# Patient Record
Sex: FEMALE | Race: OTHER | Employment: FULL TIME | URBAN - METROPOLITAN AREA
[De-identification: names, ages, dates, MRNs, and addresses within clinical notes are randomized per-mention and may not be internally consistent; named-entity substitution may affect disease eponyms.]

---

## 2017-04-25 ENCOUNTER — TELEPHONE (OUTPATIENT)
Dept: OBGYN CLINIC | Facility: CLINIC | Age: 42
End: 2017-04-25

## 2017-04-25 DIAGNOSIS — Z00.00 VISIT FOR ANNUAL HEALTH EXAMINATION: Primary | ICD-10-CM

## 2017-04-25 NOTE — TELEPHONE ENCOUNTER
PER PT REQUESTING AN COPY OF MAMMO TEST ORDER TO BE PUT INTO THE SYSTEM / PT STATE SHE MISS PLACED THE ONE  GAVE HER LAST YEAR / PLS ADV

## 2017-04-25 NOTE — TELEPHONE ENCOUNTER
Patient requesting to have a new mammogram order placed. The one in the system was from 3/9/2016 and has . She wants to have her mammogram done before her OV on 5/10. Thanks.

## 2017-05-17 ENCOUNTER — HOSPITAL ENCOUNTER (OUTPATIENT)
Dept: MAMMOGRAPHY | Age: 42
Discharge: HOME | End: 2017-05-17
Attending: OBSTETRICS & GYNECOLOGY
Payer: COMMERCIAL

## 2017-05-17 DIAGNOSIS — Z00.00 VISIT FOR ANNUAL HEALTH EXAMINATION: ICD-10-CM

## 2017-05-17 PROCEDURE — 77067 SCR MAMMO BI INCL CAD: CPT | Performed by: OBSTETRICS & GYNECOLOGY

## 2017-05-25 ENCOUNTER — OFFICE VISIT (OUTPATIENT)
Dept: OBGYN CLINIC | Facility: CLINIC | Age: 42
End: 2017-05-25

## 2017-05-25 VITALS
DIASTOLIC BLOOD PRESSURE: 90 MMHG | HEART RATE: 93 BPM | WEIGHT: 145 LBS | SYSTOLIC BLOOD PRESSURE: 130 MMHG | BODY MASS INDEX: 25 KG/M2

## 2017-05-25 DIAGNOSIS — Z01.419 ENCOUNTER FOR GYNECOLOGICAL EXAMINATION WITHOUT ABNORMAL FINDING: ICD-10-CM

## 2017-05-25 DIAGNOSIS — Z12.31 ENCOUNTER FOR SCREENING MAMMOGRAM FOR BREAST CANCER: Primary | ICD-10-CM

## 2017-05-25 PROCEDURE — 99396 PREV VISIT EST AGE 40-64: CPT | Performed by: OBSTETRICS & GYNECOLOGY

## 2017-05-25 NOTE — PROGRESS NOTES
HPI:    Patient ID: Renny Booker is a 43year old female. HPI  Patient here for routine exam.  No C/Os. Patient in her fifth year with Paragard IUD. LPS 2016 WNL with - HPV. Next pap due in 2019. Had a normal mammogram this year.   Mammogram order ASSESSMENT/PLAN:   Encounter for screening mammogram for breast cancer  (primary encounter diagnosis)  Encounter for gynecological examination without abnormal finding [z01.419]   F/U Labs. Encouraged monthly SBE. Discussed diet and exercise.   No order

## 2017-05-30 ENCOUNTER — HOSPITAL ENCOUNTER (EMERGENCY)
Dept: CT IMAGING | Facility: HOSPITAL | Age: 42
Discharge: HOME | End: 2017-05-30
Attending: EMERGENCY MEDICINE
Payer: COMMERCIAL

## 2017-05-30 ENCOUNTER — APPOINTMENT (OUTPATIENT)
Dept: CT IMAGING | Facility: HOSPITAL | Age: 42
End: 2017-05-30
Attending: EMERGENCY MEDICINE
Payer: COMMERCIAL

## 2017-05-30 ENCOUNTER — HOSPITAL ENCOUNTER (EMERGENCY)
Facility: HOSPITAL | Age: 42
Discharge: HOME | End: 2017-05-30
Attending: EMERGENCY MEDICINE
Payer: COMMERCIAL

## 2017-05-30 ENCOUNTER — TELEPHONE (OUTPATIENT)
Dept: INTERNAL MEDICINE CLINIC | Facility: CLINIC | Age: 42
End: 2017-05-30

## 2017-05-30 VITALS
BODY MASS INDEX: 26.13 KG/M2 | OXYGEN SATURATION: 98 % | TEMPERATURE: 98 F | HEIGHT: 62 IN | RESPIRATION RATE: 18 BRPM | SYSTOLIC BLOOD PRESSURE: 102 MMHG | HEART RATE: 60 BPM | WEIGHT: 142 LBS | DIASTOLIC BLOOD PRESSURE: 72 MMHG

## 2017-05-30 DIAGNOSIS — R07.9 ACUTE CHEST PAIN: Primary | ICD-10-CM

## 2017-05-30 PROCEDURE — 81025 URINE PREGNANCY TEST: CPT

## 2017-05-30 PROCEDURE — 81003 URINALYSIS AUTO W/O SCOPE: CPT | Performed by: EMERGENCY MEDICINE

## 2017-05-30 PROCEDURE — 96375 TX/PRO/DX INJ NEW DRUG ADDON: CPT

## 2017-05-30 PROCEDURE — 99285 EMERGENCY DEPT VISIT HI MDM: CPT

## 2017-05-30 PROCEDURE — 80048 BASIC METABOLIC PNL TOTAL CA: CPT | Performed by: EMERGENCY MEDICINE

## 2017-05-30 PROCEDURE — 85025 COMPLETE CBC W/AUTO DIFF WBC: CPT | Performed by: EMERGENCY MEDICINE

## 2017-05-30 PROCEDURE — 84484 ASSAY OF TROPONIN QUANT: CPT | Performed by: EMERGENCY MEDICINE

## 2017-05-30 PROCEDURE — 93005 ELECTROCARDIOGRAM TRACING: CPT

## 2017-05-30 PROCEDURE — 93010 ELECTROCARDIOGRAM REPORT: CPT | Performed by: EMERGENCY MEDICINE

## 2017-05-30 PROCEDURE — 96374 THER/PROPH/DIAG INJ IV PUSH: CPT

## 2017-05-30 PROCEDURE — 75574 CT ANGIO HRT W/3D IMAGE: CPT | Performed by: EMERGENCY MEDICINE

## 2017-05-30 RX ORDER — METOPROLOL TARTRATE 5 MG/5ML
INJECTION INTRAVENOUS
Status: COMPLETED
Start: 2017-05-30 | End: 2017-05-30

## 2017-05-30 RX ORDER — ALPRAZOLAM 0.25 MG/1
0.25 TABLET ORAL
Status: COMPLETED | OUTPATIENT
Start: 2017-05-30 | End: 2017-05-30

## 2017-05-30 RX ORDER — NITROGLYCERIN 0.4 MG/1
TABLET SUBLINGUAL
Status: DISCONTINUED
Start: 2017-05-30 | End: 2017-05-30 | Stop reason: WASHOUT

## 2017-05-30 RX ORDER — NITROGLYCERIN 0.4 MG/1
0.4 TABLET SUBLINGUAL ONCE
Status: COMPLETED | OUTPATIENT
Start: 2017-05-30 | End: 2017-05-30

## 2017-05-30 RX ORDER — METOPROLOL TARTRATE 50 MG/1
100 TABLET, FILM COATED ORAL ONCE AS NEEDED
Status: CANCELLED | OUTPATIENT
Start: 2017-05-30 | End: 2017-05-30

## 2017-05-30 RX ORDER — NITROGLYCERIN 0.4 MG/1
0.4 TABLET SUBLINGUAL ONCE
Status: CANCELLED | OUTPATIENT
Start: 2017-05-30 | End: 2017-05-30

## 2017-05-30 RX ORDER — DILTIAZEM HYDROCHLORIDE 5 MG/ML
10 INJECTION INTRAVENOUS SEE ADMIN INSTRUCTIONS
Status: DISCONTINUED | OUTPATIENT
Start: 2017-05-30 | End: 2017-05-30

## 2017-05-30 RX ORDER — METOPROLOL TARTRATE 5 MG/5ML
5 INJECTION INTRAVENOUS SEE ADMIN INSTRUCTIONS
Status: DISCONTINUED | OUTPATIENT
Start: 2017-05-30 | End: 2017-05-30

## 2017-05-30 RX ORDER — METOPROLOL TARTRATE 50 MG/1
50 TABLET, FILM COATED ORAL ONCE AS NEEDED
Status: DISCONTINUED | OUTPATIENT
Start: 2017-05-30 | End: 2017-05-30

## 2017-05-30 RX ORDER — METOPROLOL TARTRATE 50 MG/1
50 TABLET, FILM COATED ORAL ONCE
Status: CANCELLED | OUTPATIENT
Start: 2017-05-30 | End: 2017-05-30

## 2017-05-30 RX ORDER — METOPROLOL TARTRATE 5 MG/5ML
5 INJECTION INTRAVENOUS SEE ADMIN INSTRUCTIONS
Status: CANCELLED | OUTPATIENT
Start: 2017-05-30

## 2017-05-30 RX ORDER — DILTIAZEM HYDROCHLORIDE 5 MG/ML
10 INJECTION INTRAVENOUS SEE ADMIN INSTRUCTIONS
Status: CANCELLED | OUTPATIENT
Start: 2017-05-30

## 2017-05-30 RX ORDER — DILTIAZEM HYDROCHLORIDE 5 MG/ML
INJECTION INTRAVENOUS
Status: DISCONTINUED
Start: 2017-05-30 | End: 2017-05-30

## 2017-05-30 RX ORDER — ALPRAZOLAM 0.25 MG/1
0.25 TABLET ORAL
Status: CANCELLED | OUTPATIENT
Start: 2017-05-30 | End: 2017-05-30

## 2017-05-30 RX ORDER — METOPROLOL TARTRATE 50 MG/1
50 TABLET, FILM COATED ORAL ONCE AS NEEDED
Status: CANCELLED | OUTPATIENT
Start: 2017-05-30 | End: 2017-05-30

## 2017-05-30 RX ORDER — METOPROLOL TARTRATE 50 MG/1
100 TABLET, FILM COATED ORAL ONCE
Status: CANCELLED | OUTPATIENT
Start: 2017-05-30 | End: 2017-05-30

## 2017-05-30 RX ORDER — METOPROLOL TARTRATE 50 MG/1
100 TABLET, FILM COATED ORAL ONCE AS NEEDED
Status: COMPLETED | OUTPATIENT
Start: 2017-05-30 | End: 2017-05-30

## 2017-05-30 NOTE — TELEPHONE ENCOUNTER
Actions Requested: Pt referred to ED  Situation/Background   Problem: chest pain, htn   Onset: Friday   Associated Symptoms: Denies diaphoresis, nausea, weakness or dizziness.    History of Same: No   Precipitated By:    Aggravating/Alleviating Factors: Pt nitroglycerin and chest pain was not relieved, history of heart disease (i.e., angina, heart attack, bypass surgery, angioplasty, CHF)  * Visible sweat on face or sweat dripping down face  * Sounds like a life-threatening emergency to the triager  * Severe

## 2017-05-30 NOTE — ED NOTES
Spoke with Aisha Lopez RN for CTA and she requested ER gives 1st dose of lopressor 5mg ivp which was just given pt heart rate is 63/min at this time

## 2017-05-30 NOTE — CM/SW NOTE
Patient inquired about information on Power of GOSO. Papers given to her to read and she knows I am available to answer any questions. Reviewed paper work with her. Patient decided to take them home and complete them later.

## 2017-05-30 NOTE — IMAGING NOTE
HX TAKEN: PT PRESENTED TO ER WITH COMPLAINT OF CHEST PRESSURE AND ELEVATED BP    PT CONSENTED IN THE ER    18 GAUGE IV STARTED IN ER  CREAT = 0.63  GFR>60    PER ER NURSE REPORT, PT RECEIVED IN ER: COMPLAINT OF CHEST PRESSURE  PT RECEIVED IN THE ER  1139

## 2017-05-30 NOTE — IMAGING NOTE
CT angiogram with normal LAD and 2 Diags  LCX and 2 OM normal.  RCA large dominant normal PDA PLV normal.

## 2017-05-30 NOTE — TELEPHONE ENCOUNTER
Pt blood pressure is high and is having chest pain ,  The pain has been on going but getting worse ,

## 2017-05-31 NOTE — TELEPHONE ENCOUNTER
LMTCB, please transfer to x  until 530 today or x [phone] -    Follow up call from ER - CSS please offer f/u appt with EG

## 2017-06-08 NOTE — PROGRESS NOTES
HPI:    Patient ID: Renny Booker is a 43year old female.     HPI     Chest pain   5/30/2017 patient went to the ER due to chest pain; EKG done; negative troponin; labs unremarkable; normal coronary CT angiography; no acute findings concerning for CAD, ch place, and time. She appears well-developed and well-nourished. HENT:   Head: Normocephalic and atraumatic. Eyes: Conjunctivae are normal.   Neck: Normal range of motion. Cardiovascular: Normal rate, regular rhythm and normal heart sounds.     No murm Leisa Cabezas MD.   Electronically Signed: Ashwini Sommer, 6/8/2017, 6:35 PM.    Jo Ann Chandler MD,  personally performed the services described in this documentation. All medical record entries made by the scribe were at my direction and in my presence.

## 2018-01-24 ENCOUNTER — OFFICE VISIT (OUTPATIENT)
Dept: INTERNAL MEDICINE CLINIC | Facility: CLINIC | Age: 43
End: 2018-01-24

## 2018-01-24 VITALS
SYSTOLIC BLOOD PRESSURE: 124 MMHG | TEMPERATURE: 98 F | HEART RATE: 76 BPM | WEIGHT: 146 LBS | DIASTOLIC BLOOD PRESSURE: 83 MMHG | BODY MASS INDEX: 24.92 KG/M2 | HEIGHT: 64 IN

## 2018-01-24 DIAGNOSIS — Z00.00 ROUTINE GENERAL MEDICAL EXAMINATION AT A HEALTH CARE FACILITY: Primary | ICD-10-CM

## 2018-01-24 DIAGNOSIS — Z12.31 VISIT FOR SCREENING MAMMOGRAM: ICD-10-CM

## 2018-01-24 PROCEDURE — 99396 PREV VISIT EST AGE 40-64: CPT | Performed by: INTERNAL MEDICINE

## 2018-01-27 ENCOUNTER — APPOINTMENT (OUTPATIENT)
Dept: LAB | Age: 43
End: 2018-01-27
Attending: INTERNAL MEDICINE
Payer: COMMERCIAL

## 2018-01-27 DIAGNOSIS — Z00.00 ROUTINE GENERAL MEDICAL EXAMINATION AT A HEALTH CARE FACILITY: ICD-10-CM

## 2018-01-27 LAB
ALBUMIN SERPL BCP-MCNC: 4.1 G/DL (ref 3.5–4.8)
ALBUMIN/GLOB SERPL: 1.6 {RATIO} (ref 1–2)
ALP SERPL-CCNC: 49 U/L (ref 32–100)
ALT SERPL-CCNC: 44 U/L (ref 14–54)
ANION GAP SERPL CALC-SCNC: 7 MMOL/L (ref 0–18)
AST SERPL-CCNC: 23 U/L (ref 15–41)
BACTERIA UR QL AUTO: NEGATIVE /HPF
BILIRUB SERPL-MCNC: 0.8 MG/DL (ref 0.3–1.2)
BUN SERPL-MCNC: 8 MG/DL (ref 8–20)
BUN/CREAT SERPL: 12.9 (ref 10–20)
CALCIUM SERPL-MCNC: 9.2 MG/DL (ref 8.5–10.5)
CHLORIDE SERPL-SCNC: 109 MMOL/L (ref 95–110)
CHOLEST SERPL-MCNC: 179 MG/DL (ref 110–200)
CO2 SERPL-SCNC: 25 MMOL/L (ref 22–32)
CREAT SERPL-MCNC: 0.62 MG/DL (ref 0.5–1.5)
ERYTHROCYTE [DISTWIDTH] IN BLOOD BY AUTOMATED COUNT: 12.7 % (ref 11–15)
GLOBULIN PLAS-MCNC: 2.5 G/DL (ref 2.5–3.7)
GLUCOSE SERPL-MCNC: 91 MG/DL (ref 70–99)
HCT VFR BLD AUTO: 39.7 % (ref 35–48)
HDLC SERPL-MCNC: 54 MG/DL
HGB BLD-MCNC: 13.5 G/DL (ref 12–16)
LDLC SERPL CALC-MCNC: 108 MG/DL (ref 0–99)
MCH RBC QN AUTO: 32.6 PG (ref 27–32)
MCHC RBC AUTO-ENTMCNC: 33.9 G/DL (ref 32–37)
MCV RBC AUTO: 96.3 FL (ref 80–100)
NONHDLC SERPL-MCNC: 125 MG/DL
OSMOLALITY UR CALC.SUM OF ELEC: 290 MOSM/KG (ref 275–295)
PLATELET # BLD AUTO: 283 K/UL (ref 140–400)
PMV BLD AUTO: 8.8 FL (ref 7.4–10.3)
POTASSIUM SERPL-SCNC: 3.8 MMOL/L (ref 3.3–5.1)
PROT SERPL-MCNC: 6.6 G/DL (ref 5.9–8.4)
RBC # BLD AUTO: 4.13 M/UL (ref 3.7–5.4)
RBC #/AREA URNS AUTO: 2 /HPF
SODIUM SERPL-SCNC: 141 MMOL/L (ref 136–144)
T4 FREE SERPL-MCNC: 1 NG/DL (ref 0.58–1.64)
TRIGL SERPL-MCNC: 83 MG/DL (ref 1–149)
TSH SERPL-ACNC: 1.48 UIU/ML (ref 0.45–5.33)
WBC # BLD AUTO: 8.9 K/UL (ref 4–11)
WBC #/AREA URNS AUTO: 4 /HPF

## 2018-01-27 PROCEDURE — 82306 VITAMIN D 25 HYDROXY: CPT

## 2018-01-27 PROCEDURE — 80061 LIPID PANEL: CPT

## 2018-01-27 PROCEDURE — 84439 ASSAY OF FREE THYROXINE: CPT

## 2018-01-27 PROCEDURE — 81015 MICROSCOPIC EXAM OF URINE: CPT

## 2018-01-27 PROCEDURE — 84443 ASSAY THYROID STIM HORMONE: CPT

## 2018-01-27 PROCEDURE — 85027 COMPLETE CBC AUTOMATED: CPT

## 2018-01-27 PROCEDURE — 80053 COMPREHEN METABOLIC PANEL: CPT

## 2018-01-27 PROCEDURE — 36415 COLL VENOUS BLD VENIPUNCTURE: CPT

## 2018-01-29 ENCOUNTER — TELEPHONE (OUTPATIENT)
Dept: INTERNAL MEDICINE CLINIC | Facility: CLINIC | Age: 43
End: 2018-01-29

## 2018-01-29 LAB — 25(OH)D3 SERPL-MCNC: 25.1 NG/ML

## 2018-01-29 NOTE — TELEPHONE ENCOUNTER
Notes Recorded by Marlena Cuenca MD on 1/29/2018 at 2:43 AM CST  All labs are within normal limit  Send letter and copy of test result to patient

## 2018-01-29 NOTE — TELEPHONE ENCOUNTER
Patient informed of results (identified name and ), as per Dr. SMITH's result note but informed Vit D lab is still \"in process. \" Patient voiced understanding and will await Vit D results.

## 2018-02-05 NOTE — PROGRESS NOTES
HPI:    Patient ID: Priya Vela is a 43year old female.     HPI     Physical exam    /83 (BP Location: Right arm, Patient Position: Sitting, Cuff Size: adult)   Pulse 76   Temp 98.1 °F (36.7 °C) (Oral)   Ht 5' 4\" (1.626 m)   Wt 146 lb (66.2 kg) multigravida 35+    • History of pregnancy , , [address] [facility] [facility], ,      x3; 5 hr labor , 12 hr labor- , 5 hr labor- ,        No past surgical history on file.    Family History   Problem Relation Age of Onset   • [facility]  (primary encounter diagnosis)  Plan: VITAMIN D, 25-HYDROXY, ASSAY, THYROID STIM         HORMONE, CBC, PLATELET; NO DIFFERENTIAL, COMP         METABOLIC PANEL (14), FREE T4 (FREE THYROXINE),        LIPID PANEL, URINE MICROSCOPIC W REFLEX CULT

## 2018-02-07 ENCOUNTER — OFFICE VISIT (OUTPATIENT)
Dept: INTERNAL MEDICINE CLINIC | Facility: CLINIC | Age: 43
End: 2018-02-07

## 2018-02-07 VITALS
WEIGHT: 145 LBS | DIASTOLIC BLOOD PRESSURE: 85 MMHG | SYSTOLIC BLOOD PRESSURE: 142 MMHG | TEMPERATURE: 98 F | BODY MASS INDEX: 24.75 KG/M2 | HEART RATE: 90 BPM | HEIGHT: 64 IN

## 2018-02-07 DIAGNOSIS — J11.1 FLU SYNDROME: Primary | ICD-10-CM

## 2018-02-07 DIAGNOSIS — J02.9 PHARYNGITIS, UNSPECIFIED ETIOLOGY: ICD-10-CM

## 2018-02-07 LAB
POCT LOT NUMBER: NORMAL
POCT RAPID STREP: NEGATIVE
PROCEDURE CONTROL: YES

## 2018-02-07 PROCEDURE — 99213 OFFICE O/P EST LOW 20 MIN: CPT | Performed by: INTERNAL MEDICINE

## 2018-02-08 LAB
FLUAV + FLUBV RNA SPEC NAA+PROBE: NEGATIVE

## 2018-02-18 NOTE — PROGRESS NOTES
HPI:    Patient ID: Richi He is a 43year old female.     HPI  sore throat x 4 days , HA's   Cough congestion body aches    /85 (BP Location: Right arm, Patient Position: Sitting, Cuff Size: adult)   Pulse 90   Temp 98.3 °F (36.8 °C) (Oral)   Ht Diagnosis Date   • Advanced maternal age (AMA) in pregnancy    • AMA (advanced maternal age) multigravida 35+    • History of pregnancy , , [address] [facility] [facility], ,      x3; 5 hr labor , 12 hr labor- , 5 hr labor- ,

## 2018-04-25 ENCOUNTER — TELEPHONE (OUTPATIENT)
Dept: OBGYN CLINIC | Facility: CLINIC | Age: 43
End: 2018-04-25

## 2018-04-25 DIAGNOSIS — R30.0 DYSURIA: Primary | ICD-10-CM

## 2018-04-25 RX ORDER — NITROFURANTOIN 25; 75 MG/1; MG/1
100 CAPSULE ORAL 2 TIMES DAILY
Qty: 10 CAPSULE | Refills: 0 | Status: SHIPPED | OUTPATIENT
Start: 2018-04-25 | End: 2018-04-30

## 2018-04-25 NOTE — TELEPHONE ENCOUNTER
Pt voices since this past Monday, she has been having urgency and dysuria. Pt denies any temperature or hematuria. Encouraged pt to drink a lot of water and cranberry juice. Please advise.

## 2018-04-25 NOTE — TELEPHONE ENCOUNTER
Symptoms sound like a UTI. May send in Macrobid 100 mg BID x 5 days. Patient should go to lab for U/A and C &S today prior to starting medication. Diagnosis is Dysuria.

## 2018-04-27 NOTE — TELEPHONE ENCOUNTER
Pt informed of rx and need to given urine sample prior to beginning medication. Pt verbalized understanding. No further question.

## 2018-08-15 ENCOUNTER — OFFICE VISIT (OUTPATIENT)
Dept: OBGYN CLINIC | Facility: CLINIC | Age: 43
End: 2018-08-15
Payer: COMMERCIAL

## 2018-08-15 VITALS
SYSTOLIC BLOOD PRESSURE: 139 MMHG | DIASTOLIC BLOOD PRESSURE: 86 MMHG | WEIGHT: 145.38 LBS | HEART RATE: 81 BPM | BODY MASS INDEX: 25 KG/M2

## 2018-08-15 DIAGNOSIS — Z01.419 ENCOUNTER FOR GYNECOLOGICAL EXAMINATION WITHOUT ABNORMAL FINDING: ICD-10-CM

## 2018-08-15 DIAGNOSIS — Z12.31 ENCOUNTER FOR SCREENING MAMMOGRAM FOR BREAST CANCER: Primary | ICD-10-CM

## 2018-08-15 PROCEDURE — 99396 PREV VISIT EST AGE 40-64: CPT | Performed by: OBSTETRICS & GYNECOLOGY

## 2018-08-15 NOTE — PROGRESS NOTES
HPI:    Patient ID: Janet Medina is a 37year old female. HPI  Patient here for routine exam.  No C/Os. LPS 2016, WNL with negative HPV. Next pap due next year. PCP gave patient mammogram order. Paragard IUD in for the sixth year.       Review of exercise. No orders of the defined types were placed in this encounter.       Meds This Visit:  No prescriptions requested or ordered in this encounter    Imaging & Referrals:  None       XY#[id number]

## 2018-08-22 ENCOUNTER — HOSPITAL ENCOUNTER (OUTPATIENT)
Dept: MAMMOGRAPHY | Age: 43
Discharge: HOME | End: 2018-08-22
Attending: INTERNAL MEDICINE
Payer: COMMERCIAL

## 2018-08-22 DIAGNOSIS — Z12.31 VISIT FOR SCREENING MAMMOGRAM: ICD-10-CM

## 2018-08-22 PROCEDURE — 77067 SCR MAMMO BI INCL CAD: CPT | Performed by: INTERNAL MEDICINE

## 2019-01-25 ENCOUNTER — OFFICE VISIT (OUTPATIENT)
Dept: INTERNAL MEDICINE CLINIC | Facility: CLINIC | Age: 44
End: 2019-01-25
Payer: COMMERCIAL

## 2019-01-25 VITALS
HEIGHT: 64 IN | WEIGHT: 145 LBS | BODY MASS INDEX: 24.75 KG/M2 | SYSTOLIC BLOOD PRESSURE: 137 MMHG | DIASTOLIC BLOOD PRESSURE: 84 MMHG | TEMPERATURE: 99 F | HEART RATE: 102 BPM

## 2019-01-25 DIAGNOSIS — J11.1 INFLUENZA: Primary | ICD-10-CM

## 2019-01-25 DIAGNOSIS — J11.00 PNEUMONIA AND INFLUENZA: ICD-10-CM

## 2019-01-25 PROCEDURE — 99213 OFFICE O/P EST LOW 20 MIN: CPT | Performed by: INTERNAL MEDICINE

## 2019-01-25 PROCEDURE — 99212 OFFICE O/P EST SF 10 MIN: CPT | Performed by: INTERNAL MEDICINE

## 2019-01-25 NOTE — PATIENT INSTRUCTIONS
Influenza (Adult)    Influenza is also called the flu. It is a viral illness that affects the air passages of your lungs. It is different from the common cold. The flu can easily be passed from one to person to another.  It may be spread through the air b If you are age 72 or older, talk with your provider about getting a pneumococcal vaccine every 5 years. You should also get this vaccine if you have chronic asthma or COPD. All adults should get a flu vaccine every fall. Ask your provider about this.   When The flu is caused by a virus. The virus spreads through the air in droplets when someone who has the flu coughs, sneezes, laughs, or talks. You can become infected when you inhale these viruses directly.  You can also become infected when you touch a surfac · Wash your hands often, especially after coughing or sneezing. Or clean your hands with an alcohol-based hand  containing at least 60% alcohol. · Cough or sneeze into a tissue. Then throw the tissue away and wash your hands.  If you don’t have a ti · Use warm water and plenty of soap. Rub your hands together well. · Clean the whole hand, including under your nails, between your fingers, and up the wrists. · Wash for at least 15 seconds.   · Rinse, letting the water run down your fingers, not up your

## 2019-01-25 NOTE — PROGRESS NOTES
HPI:    Patient ID: Addi Chaudhry is a 37year old female. Chief Complaint: Cough (Pt has cold, cough, headache, body aches, runny nose, chills x 2 days ago, taking mucinex and tylenol for headache)      URI    This is a new problem.  Episode onset: [facility] HENT: Positive for congestion, rhinorrhea and sneezing. Negative for ear pain, sinus pain and sore throat. Eyes: Negative for pain. Respiratory: Positive for cough (yellow mucous, started yesterday, tsp at a time, increasing in frequency; no blood ). Pulmonary/Chest: Effort normal and breath sounds normal. No respiratory distress. She has no wheezes. She has no rales. She exhibits no tenderness. Abdominal: Soft. Bowel sounds are normal. There is no tenderness.    Lymphadenopathy:        Head (right si ----------------------------------------- END NOTE-----------------------------------------     Patient Instructions       Influenza (Adult)    Influenza is also called the flu. It is a viral illness that affects the air passages of your lungs.  It is diffe Follow up with your healthcare provider, or as advised, if you are not getting better over the next week. If you are age 72 or older, talk with your provider about getting a pneumococcal vaccine every 5 years.  You should also get this vaccine if you have How does the flu spread? The flu is caused by a virus. The virus spreads through the air in droplets when someone who has the flu coughs, sneezes, laughs, or talks. You can become infected when you inhale these viruses directly.  You can also become infect · Wash your hands often, especially after coughing or sneezing. Or clean your hands with an alcohol-based hand  containing at least 60% alcohol. · Cough or sneeze into a tissue. Then throw the tissue away and wash your hands.  If you don’t have a ti · Use warm water and plenty of soap. Rub your hands together well. · Clean the whole hand, including under your nails, between your fingers, and up the wrists. · Wash for at least 15 seconds.   · Rinse, letting the water run down your fingers, not up your

## 2019-02-19 ENCOUNTER — HOSPITAL (OUTPATIENT)
Dept: OTHER | Age: 44
End: 2019-02-19
Attending: EMERGENCY MEDICINE

## 2019-02-19 LAB
ALBUMIN SERPL-MCNC: 3.8 GM/DL (ref 3.6–5.1)
ALBUMIN/GLOB SERPL: 1.2 {RATIO} (ref 1–2.4)
ALP SERPL-CCNC: 75 UNIT/L (ref 45–117)
ALT SERPL-CCNC: 94 UNIT/L
ANALYZER ANC (IANC): NORMAL
ANION GAP SERPL CALC-SCNC: 16 MMOL/L (ref 10–20)
AST SERPL-CCNC: 196 UNIT/L
BASOPHILS # BLD: 0.1 THOUSAND/MCL (ref 0–0.3)
BASOPHILS NFR BLD: 1 %
BILIRUB SERPL-MCNC: 0.9 MG/DL (ref 0.2–1)
BUN SERPL-MCNC: 10 MG/DL (ref 6–20)
BUN/CREAT SERPL: 17 (ref 7–25)
CALCIUM SERPL-MCNC: 8.6 MG/DL (ref 8.4–10.2)
CHLORIDE: 104 MMOL/L (ref 98–107)
CO2 SERPL-SCNC: 23 MMOL/L (ref 21–32)
CREAT SERPL-MCNC: 0.58 MG/DL (ref 0.51–0.95)
DIFFERENTIAL METHOD BLD: NORMAL
EOSINOPHIL # BLD: 0.3 THOUSAND/MCL (ref 0.1–0.5)
EOSINOPHIL NFR BLD: 4 %
ERYTHROCYTE [DISTWIDTH] IN BLOOD: 12.1 % (ref 11–15)
GLOBULIN SER-MCNC: 3.2 GM/DL (ref 2–4)
GLUCOSE SERPL-MCNC: 136 MG/DL (ref 65–99)
HEMATOCRIT: 38.5 % (ref 36–46.5)
HGB BLD-MCNC: 13.2 GM/DL (ref 12–15.5)
IMM GRANULOCYTES # BLD AUTO: 0 THOUSAND/MCL (ref 0–0.2)
IMM GRANULOCYTES NFR BLD: 0 %
LIPASE SERPL-CCNC: 197 UNIT/L (ref 73–393)
LYMPHOCYTES # BLD: 3.8 THOUSAND/MCL (ref 1–4.8)
LYMPHOCYTES NFR BLD: 39 %
MCH RBC QN AUTO: 32.2 PG (ref 26–34)
MCHC RBC AUTO-ENTMCNC: 34.3 GM/DL (ref 32–36.5)
MCV RBC AUTO: 93.9 FL (ref 78–100)
MONOCYTES # BLD: 0.6 THOUSAND/MCL (ref 0.3–0.9)
MONOCYTES NFR BLD: 6 %
NEUTROPHILS # BLD: 4.8 THOUSAND/MCL (ref 1.8–7.7)
NEUTROPHILS NFR BLD: 50 %
NEUTS SEG NFR BLD: NORMAL %
NRBC (NRBCRE): 0 /100 WBC
PLATELET # BLD: 276 THOUSAND/MCL (ref 140–450)
POTASSIUM SERPL-SCNC: 3.1 MMOL/L (ref 3.4–5.1)
PROT SERPL-MCNC: 7 GM/DL (ref 6.4–8.2)
RBC # BLD: 4.1 MILLION/MCL (ref 4–5.2)
SODIUM SERPL-SCNC: 140 MMOL/L (ref 135–145)
WBC # BLD: 9.6 THOUSAND/MCL (ref 4.2–11)

## 2019-03-12 ENCOUNTER — OFFICE VISIT (OUTPATIENT)
Dept: INTERNAL MEDICINE CLINIC | Facility: CLINIC | Age: 44
End: 2019-03-12
Payer: COMMERCIAL

## 2019-03-12 VITALS
SYSTOLIC BLOOD PRESSURE: 152 MMHG | TEMPERATURE: 98 F | HEIGHT: 64 IN | DIASTOLIC BLOOD PRESSURE: 93 MMHG | BODY MASS INDEX: 24.72 KG/M2 | HEART RATE: 82 BPM | WEIGHT: 144.81 LBS

## 2019-03-12 DIAGNOSIS — K29.70 GASTRITIS WITHOUT BLEEDING, UNSPECIFIED CHRONICITY, UNSPECIFIED GASTRITIS TYPE: ICD-10-CM

## 2019-03-12 DIAGNOSIS — R79.89 ELEVATED LFTS: ICD-10-CM

## 2019-03-12 DIAGNOSIS — K82.8 GALLBLADDER SLUDGE: ICD-10-CM

## 2019-03-12 DIAGNOSIS — K80.20 GALLSTONES: Primary | ICD-10-CM

## 2019-03-12 DIAGNOSIS — R03.0 ELEVATED BP WITHOUT DIAGNOSIS OF HYPERTENSION: ICD-10-CM

## 2019-03-12 PROCEDURE — 99212 OFFICE O/P EST SF 10 MIN: CPT | Performed by: INTERNAL MEDICINE

## 2019-03-12 PROCEDURE — 99214 OFFICE O/P EST MOD 30 MIN: CPT | Performed by: INTERNAL MEDICINE

## 2019-03-12 NOTE — PROGRESS NOTES
HPI:    Patient ID: Josef Sierra is a 37year old female.     HPI    BP (!) 152/93 (BP Location: Right arm, Patient Position: Sitting, Cuff Size: adult)   Pulse 82   Temp 98.3 °F (36.8 °C) (Oral)   Ht 5' 4\" (1.626 m)   Wt 144 lb 12.8 oz (65.7 kg)   LMP 0

## 2019-03-14 NOTE — H&P
[address] [facility] [address] Gastroenterology                                                                                                  Clinic History and Physical     Pa Office  - Lives with spouse  - NSAIDs/ASA use: PRN      History, Medications, Allergies, ROS:      Past Medical History:   Diagnosis Date   • Advanced maternal age (AMA) in pregnancy    • AMA (advanced maternal age) multigravida 35+    • High blood pressur 02/24/2019, not currently breastfeeding.     Gen: patient appears comfortable and in no acute distress  HEENT: conjunctiva pink, the sclera appears anicteric, oropharynx clear, mucus membranes appear moist  CV: regular rate and rhythm, the extremities are w with dietary indiscretions would recommend a trial of Zantac or similar over-the-counter medication prior to the known food trigger. If the patient continues to have breakthrough symptoms despite this, consider H. pylori testing and trial of PPI therapy.

## 2019-03-16 ENCOUNTER — LAB ENCOUNTER (OUTPATIENT)
Dept: LAB | Age: 44
End: 2019-03-16
Attending: INTERNAL MEDICINE
Payer: COMMERCIAL

## 2019-03-16 DIAGNOSIS — R30.0 DYSURIA: ICD-10-CM

## 2019-03-16 DIAGNOSIS — K80.20 GALLSTONES: ICD-10-CM

## 2019-03-16 DIAGNOSIS — R03.0 ELEVATED BP WITHOUT DIAGNOSIS OF HYPERTENSION: ICD-10-CM

## 2019-03-16 LAB
ALBUMIN SERPL-MCNC: 4 G/DL (ref 3.4–5)
ALBUMIN/GLOB SERPL: 1.2 {RATIO} (ref 1–2)
ALP LIVER SERPL-CCNC: 62 U/L (ref 37–98)
ALT SERPL-CCNC: 46 U/L (ref 13–56)
ANION GAP SERPL CALC-SCNC: 8 MMOL/L (ref 0–18)
AST SERPL-CCNC: 19 U/L (ref 15–37)
BILIRUB SERPL-MCNC: 0.5 MG/DL (ref 0.1–2)
BILIRUB UR QL: NEGATIVE
BUN BLD-MCNC: 7 MG/DL (ref 7–18)
BUN/CREAT SERPL: 11.1 (ref 10–20)
CALCIUM BLD-MCNC: 8.9 MG/DL (ref 8.5–10.1)
CHLORIDE SERPL-SCNC: 109 MMOL/L (ref 98–107)
CHOLEST SMN-MCNC: 196 MG/DL (ref ?–200)
CO2 SERPL-SCNC: 25 MMOL/L (ref 21–32)
COLOR UR: YELLOW
CREAT BLD-MCNC: 0.63 MG/DL (ref 0.55–1.02)
GLOBULIN PLAS-MCNC: 3.4 G/DL (ref 2.8–4.4)
GLUCOSE BLD-MCNC: 92 MG/DL (ref 70–99)
GLUCOSE UR-MCNC: NEGATIVE MG/DL
HDLC SERPL-MCNC: 55 MG/DL (ref 40–59)
KETONES UR-MCNC: NEGATIVE MG/DL
LDLC SERPL CALC-MCNC: 119 MG/DL (ref ?–100)
M PROTEIN MFR SERPL ELPH: 7.4 G/DL (ref 6.4–8.2)
NITRITE UR QL STRIP.AUTO: NEGATIVE
NONHDLC SERPL-MCNC: 141 MG/DL (ref ?–130)
OSMOLALITY SERPL CALC.SUM OF ELEC: 292 MOSM/KG (ref 275–295)
PH UR: 5 [PH] (ref 5–8)
POTASSIUM SERPL-SCNC: 3.4 MMOL/L (ref 3.5–5.1)
PROT UR-MCNC: NEGATIVE MG/DL
RBC #/AREA URNS AUTO: 2 /HPF
RBC #/AREA URNS AUTO: 2 /HPF
SODIUM SERPL-SCNC: 142 MMOL/L (ref 136–145)
SP GR UR STRIP: 1.02 (ref 1–1.03)
T4 FREE SERPL-MCNC: 1 NG/DL (ref 0.8–1.7)
TRIGL SERPL-MCNC: 111 MG/DL (ref 30–149)
TSI SER-ACNC: 1.64 MIU/ML (ref 0.36–3.74)
UROBILINOGEN UR STRIP-ACNC: <2
VIT C UR-MCNC: 40 MG/DL
VLDLC SERPL CALC-MCNC: 22 MG/DL (ref 0–30)
WBC #/AREA URNS AUTO: 10 /HPF
WBC #/AREA URNS AUTO: 10 /HPF

## 2019-03-16 PROCEDURE — 87088 URINE BACTERIA CULTURE: CPT

## 2019-03-16 PROCEDURE — 84439 ASSAY OF FREE THYROXINE: CPT

## 2019-03-16 PROCEDURE — 36415 COLL VENOUS BLD VENIPUNCTURE: CPT

## 2019-03-16 PROCEDURE — 84443 ASSAY THYROID STIM HORMONE: CPT

## 2019-03-16 PROCEDURE — 87086 URINE CULTURE/COLONY COUNT: CPT

## 2019-03-16 PROCEDURE — 80061 LIPID PANEL: CPT

## 2019-03-16 PROCEDURE — 87186 SC STD MICRODIL/AGAR DIL: CPT

## 2019-03-16 PROCEDURE — 80053 COMPREHEN METABOLIC PANEL: CPT

## 2019-03-16 PROCEDURE — 81015 MICROSCOPIC EXAM OF URINE: CPT

## 2019-03-16 PROCEDURE — 81001 URINALYSIS AUTO W/SCOPE: CPT

## 2019-03-16 PROCEDURE — 87077 CULTURE AEROBIC IDENTIFY: CPT

## 2019-03-18 ENCOUNTER — TELEPHONE (OUTPATIENT)
Dept: INTERNAL MEDICINE CLINIC | Facility: CLINIC | Age: 44
End: 2019-03-18

## 2019-03-18 NOTE — TELEPHONE ENCOUNTER
Called patient to clarify location of back pain. Per patient, back pain is on right side of back. Patient denies abdominal pain however states she has cramping and she is not due to get her menstrual cycle for 1-2 weeks.    Cramping in abdomen described

## 2019-03-18 NOTE — TELEPHONE ENCOUNTER
Pt stated MMP \"just called me five minutes ago. \" MMP will wait until sensitivity comes back before prescribing medication.

## 2019-03-18 NOTE — TELEPHONE ENCOUNTER
Patient calling back stating she is also having back pain that began 1 week ago that she attributed to muscle pain but when she saw UA results she thinks it may be due to infection. Complaints of constant lower right sided back pain rated 5 or 6 out of 10.

## 2019-03-18 NOTE — TELEPHONE ENCOUNTER
I called  Pt  She is going to see DR Christi gotti  She has gallstone with sludge  More likely cause of pain  Awaiting  sensitivty to urine culture  Agreeable to wait  Pain not that bad  Discussed labs    k 3.4  Will increase K in diet  Lipid mild elevation

## 2019-03-18 NOTE — TELEPHONE ENCOUNTER
Per patient, she noticed her Urinalysis showed elevated WBC and bacteria. (also noted Dr. Randall Vicente name on order). She has not seen Dr Tayo Perez in a year. Perhaps there was a registration error. Patient was informed of her lab results.   Haris Deng

## 2019-03-18 NOTE — TELEPHONE ENCOUNTER
Patient states had an abnormal hearing test and was advised by Rich Else to contact Dr. Aron Cannon to have medication started.      Please advise

## 2019-03-18 NOTE — TELEPHONE ENCOUNTER
Patient calling to follow up because she states she received a notification through [employer] that the culture result is in.  Advised still awaiting a response from Dr. Huan Nava regarding her last convo with Rn but Dr. Huan Nava is aware culture prelim result is

## 2019-03-18 NOTE — TELEPHONE ENCOUNTER
Her urinalysis is not that abnormal  It is growing E coli   Should not give that much back pain  Right posterior back could be referred pain from gallbladder  I am just leaving the clinic now  Have her go the the ER for evalaution if she is in that much pa

## 2019-03-18 NOTE — TELEPHONE ENCOUNTER
Is the back pain to the right o left?     She has only few WBC in urine  E coli growing  Sensitivity pending  Is it right back pain or abd pain  ? capri  Let me know

## 2019-03-19 ENCOUNTER — OFFICE VISIT (OUTPATIENT)
Dept: OBGYN CLINIC | Facility: CLINIC | Age: 44
End: 2019-03-19
Payer: COMMERCIAL

## 2019-03-19 ENCOUNTER — OFFICE VISIT (OUTPATIENT)
Dept: SURGERY | Facility: CLINIC | Age: 44
End: 2019-03-19
Payer: COMMERCIAL

## 2019-03-19 VITALS — BODY MASS INDEX: 24.24 KG/M2 | WEIGHT: 142 LBS | HEIGHT: 64 IN

## 2019-03-19 VITALS
WEIGHT: 142.19 LBS | DIASTOLIC BLOOD PRESSURE: 80 MMHG | SYSTOLIC BLOOD PRESSURE: 120 MMHG | BODY MASS INDEX: 24.28 KG/M2 | HEIGHT: 64 IN

## 2019-03-19 DIAGNOSIS — R30.0 DYSURIA: ICD-10-CM

## 2019-03-19 DIAGNOSIS — Z01.419 ENCOUNTER FOR WELL WOMAN EXAM WITH ROUTINE GYNECOLOGICAL EXAM: Primary | ICD-10-CM

## 2019-03-19 DIAGNOSIS — K80.20 CALCULUS OF GALLBLADDER WITHOUT CHOLECYSTITIS WITHOUT OBSTRUCTION: Primary | ICD-10-CM

## 2019-03-19 PROCEDURE — 99204 OFFICE O/P NEW MOD 45 MIN: CPT | Performed by: SURGERY

## 2019-03-19 PROCEDURE — 99212 OFFICE O/P EST SF 10 MIN: CPT | Performed by: SURGERY

## 2019-03-19 PROCEDURE — 99214 OFFICE O/P EST MOD 30 MIN: CPT | Performed by: OBSTETRICS & GYNECOLOGY

## 2019-03-19 RX ORDER — NITROFURANTOIN 25; 75 MG/1; MG/1
100 CAPSULE ORAL 2 TIMES DAILY
Qty: 14 CAPSULE | Refills: 0 | Status: SHIPPED | OUTPATIENT
Start: 2019-03-19 | End: 2019-03-26

## 2019-03-19 NOTE — PROGRESS NOTES
HPI:    Patient ID: Addi Chaudhry is a 37year old female. Patient reports low back pain, dysuria and strong odor of urine for the last two weeks. Had + Urine Culture to E/Coli. Sensitive to [address]. Desires Pap smear today.   Paragard IUD in place Visit:  Requested Prescriptions     Signed Prescriptions Disp Refills   • Nitrofurantoin Monohyd Macro (MACROBID) 100 MG Oral Cap 14 capsule 0     Sig: Take 1 capsule (100 mg total) by mouth 2 (two) times daily for 7 days.        Imaging & Referrals:  None

## 2019-03-19 NOTE — PROGRESS NOTES
HPI:    Patient ID: Danica Carroll is a 37year old female.     HPI    EPIGASTRIC PAIN  WENT TO [facility] ER  GB US   CHOLELITHIASIS AND GALLBLADDER    Currently not having pain      BP (!) 152/93 (BP Location: Right arm, Patient Position: Sitting, Cuff Size past surgical history on file.    Family History   Problem Relation Age of Onset   • Diabetes Mother    • High Blood Pressure Mother    • Depression Mother    • Cancer Maternal Grandmother         pancreatic   • Cancer Paternal Uncle         pancreatic chronicity, unspecified gastritis type  Plan: GASTRO - INTERNAL        Start antacid  GERD precaution advised    (K82.8) Gallbladder sludge  Plan: GASTRO - INTERNAL, SURGERY - INTERNAL        As above    (R94.5) Elevated LFTs  Plan: GASTRO - INTERNAL

## 2019-03-20 LAB — HPV I/H RISK 1 DNA SPEC QL NAA+PROBE: NEGATIVE

## 2019-03-21 ENCOUNTER — TELEPHONE (OUTPATIENT)
Dept: SURGERY | Facility: CLINIC | Age: 44
End: 2019-03-21

## 2019-03-21 DIAGNOSIS — K80.20 CALCULUS OF GALLBLADDER WITHOUT CHOLECYSTITIS WITHOUT OBSTRUCTION: Primary | ICD-10-CM

## 2019-03-21 NOTE — TELEPHONE ENCOUNTER
Pt Surgery/Procedure:  Laparoscopic Cholecystectomy  Pt insurance/number to contact:  [employer]  Insurance ID# and group: [id number]  Dx: Cholelithiasis  [id number]  Surgeon:  Bayron Riggs  Procedure scheduled where:  [facility]  Procedure scheduled inpt/outpt:

## 2019-03-21 NOTE — H&P
HPI:    Patient ID: Mike Greene is a 37year old female presenting with Patient presents with:  Gallbladder: Began having symptoms on 2/19/2019. Went to the ED. US done and stones and sludge was found.   No symptoms since, but does have epigastric pain organization: Not on file        Attends meetings of clubs or organizations: Not on file        Relationship status: Not on file      Intimate partner violence:        Fear of current or ex partner: Not on file        Emotionally abused: Not on file and intact distal pulses. Pulmonary/Chest: Effort normal and breath sounds normal.   Abdominal: Soft. Normal appearance. She exhibits no distension and no mass. There is no tenderness. There is no rebound and no guarding.    Musculoskeletal: Normal range

## 2019-03-21 NOTE — TELEPHONE ENCOUNTER
Contacted patient. Call was dropped. Contacted patient again. Offered 0/01 or 04/03. Patient accepted 04/03/2019 at [address] [place] [address] for surgery. Preop instructions given. Patient will need a ride to and from surgery, no ASA/NSAIDs for 7 days prior to surgery.  Zack

## 2019-03-26 ENCOUNTER — OFFICE VISIT (OUTPATIENT)
Dept: GASTROENTEROLOGY | Facility: CLINIC | Age: 44
End: 2019-03-26
Payer: COMMERCIAL

## 2019-03-26 VITALS
DIASTOLIC BLOOD PRESSURE: 88 MMHG | HEIGHT: 64 IN | WEIGHT: 143 LBS | HEART RATE: 83 BPM | BODY MASS INDEX: 24.41 KG/M2 | SYSTOLIC BLOOD PRESSURE: 137 MMHG

## 2019-03-26 DIAGNOSIS — R10.13 EPIGASTRIC PAIN: ICD-10-CM

## 2019-03-26 DIAGNOSIS — K80.20 CALCULUS OF GALLBLADDER WITHOUT CHOLECYSTITIS WITHOUT OBSTRUCTION: ICD-10-CM

## 2019-03-26 DIAGNOSIS — R79.89 ELEVATED LFTS: Primary | ICD-10-CM

## 2019-03-26 DIAGNOSIS — K21.9 GASTROESOPHAGEAL REFLUX DISEASE, ESOPHAGITIS PRESENCE NOT SPECIFIED: ICD-10-CM

## 2019-03-26 PROCEDURE — 99212 OFFICE O/P EST SF 10 MIN: CPT | Performed by: NURSE PRACTITIONER

## 2019-03-26 PROCEDURE — 99243 OFF/OP CNSLTJ NEW/EST LOW 30: CPT | Performed by: NURSE PRACTITIONER

## 2019-03-26 NOTE — PATIENT INSTRUCTIONS
1.  May take Zantac or similar medication 6-8 hours prior to meals trigger your acid reflux  2. Proceed with gallbladder surgery as scheduled  3.   Follow-up if he continues to have symptoms          Avoid Heartburn Triggers  - Laying down after meals (sit

## 2019-03-27 NOTE — TELEPHONE ENCOUNTER
Contacted patient. She requested to reschedule surgery to 07/03/2019. Advised patient we can reschedule at this time, if she needs to come in for a follow up visit or if there any problems with this we will call her back. Patient verbalized understanding.

## 2019-06-29 RX ORDER — FAMOTIDINE 20 MG/1
20 TABLET ORAL ONCE
Status: DISCONTINUED | OUTPATIENT
Start: 2019-06-29 | End: 2019-06-29

## 2019-06-29 RX ORDER — SODIUM CHLORIDE, SODIUM LACTATE, POTASSIUM CHLORIDE, CALCIUM CHLORIDE 600; 310; 30; 20 MG/100ML; MG/100ML; MG/100ML; MG/100ML
INJECTION, SOLUTION INTRAVENOUS CONTINUOUS
Status: DISCONTINUED | OUTPATIENT
Start: 2019-06-29 | End: 2019-06-29

## 2019-06-29 RX ORDER — ACETAMINOPHEN 500 MG
1000 TABLET ORAL ONCE
Status: DISCONTINUED | OUTPATIENT
Start: 2019-06-29 | End: 2019-06-29

## 2019-06-29 RX ORDER — METOCLOPRAMIDE 10 MG/1
10 TABLET ORAL ONCE
Status: DISCONTINUED | OUTPATIENT
Start: 2019-06-29 | End: 2019-06-29

## 2019-07-03 ENCOUNTER — ANESTHESIA EVENT (OUTPATIENT)
Dept: SURGERY | Facility: HOSPITAL | Age: 44
End: 2019-07-03
Payer: COMMERCIAL

## 2019-07-03 ENCOUNTER — HOSPITAL ENCOUNTER (OUTPATIENT)
Facility: HOSPITAL | Age: 44
Setting detail: HOSPITAL OUTPATIENT SURGERY
Discharge: HOME | End: 2019-07-03
Attending: SURGERY | Admitting: SURGERY
Payer: COMMERCIAL

## 2019-07-03 ENCOUNTER — ANESTHESIA (OUTPATIENT)
Dept: SURGERY | Facility: HOSPITAL | Age: 44
End: 2019-07-03
Payer: COMMERCIAL

## 2019-07-03 VITALS
HEART RATE: 71 BPM | SYSTOLIC BLOOD PRESSURE: 139 MMHG | HEIGHT: 64 IN | BODY MASS INDEX: 24.41 KG/M2 | WEIGHT: 143 LBS | DIASTOLIC BLOOD PRESSURE: 79 MMHG | TEMPERATURE: 98 F | RESPIRATION RATE: 12 BRPM | OXYGEN SATURATION: 97 %

## 2019-07-03 DIAGNOSIS — K80.20 CALCULUS OF GALLBLADDER WITHOUT CHOLECYSTITIS WITHOUT OBSTRUCTION: ICD-10-CM

## 2019-07-03 LAB — B-HCG UR QL: NEGATIVE

## 2019-07-03 PROCEDURE — 47562 LAPAROSCOPIC CHOLECYSTECTOMY: CPT | Performed by: SURGERY

## 2019-07-03 PROCEDURE — 0FT44ZZ RESECTION OF GALLBLADDER, PERCUTANEOUS ENDOSCOPIC APPROACH: ICD-10-PCS | Performed by: SURGERY

## 2019-07-03 RX ORDER — ACETAMINOPHEN 500 MG
1000 TABLET ORAL ONCE
Status: COMPLETED | OUTPATIENT
Start: 2019-07-03 | End: 2019-07-03

## 2019-07-03 RX ORDER — METOCLOPRAMIDE 10 MG/1
10 TABLET ORAL ONCE
Status: DISCONTINUED | OUTPATIENT
Start: 2019-07-03 | End: 2019-07-03 | Stop reason: HOSPADM

## 2019-07-03 RX ORDER — LIDOCAINE HYDROCHLORIDE 10 MG/ML
INJECTION, SOLUTION EPIDURAL; INFILTRATION; INTRACAUDAL; PERINEURAL AS NEEDED
Status: DISCONTINUED | OUTPATIENT
Start: 2019-07-03 | End: 2019-07-03 | Stop reason: SURG

## 2019-07-03 RX ORDER — DEXAMETHASONE SODIUM PHOSPHATE 4 MG/ML
VIAL (ML) INJECTION AS NEEDED
Status: DISCONTINUED | OUTPATIENT
Start: 2019-07-03 | End: 2019-07-03 | Stop reason: SURG

## 2019-07-03 RX ORDER — HALOPERIDOL 5 MG/ML
0.25 INJECTION INTRAMUSCULAR ONCE AS NEEDED
Status: DISCONTINUED | OUTPATIENT
Start: 2019-07-03 | End: 2019-07-03

## 2019-07-03 RX ORDER — ONDANSETRON 2 MG/ML
4 INJECTION INTRAMUSCULAR; INTRAVENOUS ONCE AS NEEDED
Status: DISCONTINUED | OUTPATIENT
Start: 2019-07-03 | End: 2019-07-03

## 2019-07-03 RX ORDER — CEFAZOLIN SODIUM/WATER 2 G/20 ML
2 SYRINGE (ML) INTRAVENOUS ONCE
Status: COMPLETED | OUTPATIENT
Start: 2019-07-03 | End: 2019-07-03

## 2019-07-03 RX ORDER — HYDROCODONE BITARTRATE AND ACETAMINOPHEN 5; 325 MG/1; MG/1
1 TABLET ORAL AS NEEDED
Status: DISCONTINUED | OUTPATIENT
Start: 2019-07-03 | End: 2019-07-03

## 2019-07-03 RX ORDER — MORPHINE SULFATE 4 MG/ML
4 INJECTION, SOLUTION INTRAMUSCULAR; INTRAVENOUS EVERY 10 MIN PRN
Status: DISCONTINUED | OUTPATIENT
Start: 2019-07-03 | End: 2019-07-03

## 2019-07-03 RX ORDER — GLYCOPYRROLATE 0.2 MG/ML
INJECTION INTRAMUSCULAR; INTRAVENOUS AS NEEDED
Status: DISCONTINUED | OUTPATIENT
Start: 2019-07-03 | End: 2019-07-03 | Stop reason: SURG

## 2019-07-03 RX ORDER — ONDANSETRON 4 MG/1
4 TABLET, FILM COATED ORAL EVERY 8 HOURS PRN
Qty: 15 TABLET | Refills: 0 | Status: SHIPPED | OUTPATIENT
Start: 2019-07-03 | End: 2019-07-23 | Stop reason: ALTCHOICE

## 2019-07-03 RX ORDER — BUPIVACAINE HYDROCHLORIDE 5 MG/ML
INJECTION, SOLUTION EPIDURAL; INTRACAUDAL AS NEEDED
Status: DISCONTINUED | OUTPATIENT
Start: 2019-07-03 | End: 2019-07-03 | Stop reason: HOSPADM

## 2019-07-03 RX ORDER — HYDROCODONE BITARTRATE AND ACETAMINOPHEN 5; 325 MG/1; MG/1
1 TABLET ORAL EVERY 4 HOURS PRN
Qty: 30 TABLET | Refills: 0 | Status: SHIPPED | OUTPATIENT
Start: 2019-07-03 | End: 2019-07-23 | Stop reason: ALTCHOICE

## 2019-07-03 RX ORDER — NALOXONE HYDROCHLORIDE 0.4 MG/ML
80 INJECTION, SOLUTION INTRAMUSCULAR; INTRAVENOUS; SUBCUTANEOUS AS NEEDED
Status: DISCONTINUED | OUTPATIENT
Start: 2019-07-03 | End: 2019-07-03

## 2019-07-03 RX ORDER — HYDROMORPHONE HYDROCHLORIDE 1 MG/ML
0.2 INJECTION, SOLUTION INTRAMUSCULAR; INTRAVENOUS; SUBCUTANEOUS EVERY 5 MIN PRN
Status: DISCONTINUED | OUTPATIENT
Start: 2019-07-03 | End: 2019-07-03

## 2019-07-03 RX ORDER — FAMOTIDINE 20 MG/1
20 TABLET ORAL ONCE
Status: DISCONTINUED | OUTPATIENT
Start: 2019-07-03 | End: 2019-07-03 | Stop reason: HOSPADM

## 2019-07-03 RX ORDER — ROCURONIUM BROMIDE 10 MG/ML
INJECTION, SOLUTION INTRAVENOUS AS NEEDED
Status: DISCONTINUED | OUTPATIENT
Start: 2019-07-03 | End: 2019-07-03 | Stop reason: SURG

## 2019-07-03 RX ORDER — HYDROMORPHONE HYDROCHLORIDE 1 MG/ML
0.4 INJECTION, SOLUTION INTRAMUSCULAR; INTRAVENOUS; SUBCUTANEOUS EVERY 5 MIN PRN
Status: DISCONTINUED | OUTPATIENT
Start: 2019-07-03 | End: 2019-07-03

## 2019-07-03 RX ORDER — HYDROCODONE BITARTRATE AND ACETAMINOPHEN 5; 325 MG/1; MG/1
2 TABLET ORAL AS NEEDED
Status: DISCONTINUED | OUTPATIENT
Start: 2019-07-03 | End: 2019-07-03

## 2019-07-03 RX ORDER — PROCHLORPERAZINE EDISYLATE 5 MG/ML
5 INJECTION INTRAMUSCULAR; INTRAVENOUS ONCE AS NEEDED
Status: DISCONTINUED | OUTPATIENT
Start: 2019-07-03 | End: 2019-07-03

## 2019-07-03 RX ORDER — NEOSTIGMINE METHYLSULFATE 0.5 MG/ML
INJECTION INTRAVENOUS AS NEEDED
Status: DISCONTINUED | OUTPATIENT
Start: 2019-07-03 | End: 2019-07-03 | Stop reason: SURG

## 2019-07-03 RX ORDER — POLYETHYLENE GLYCOL 3350 17 G/17G
17 POWDER, FOR SOLUTION ORAL DAILY
Qty: 14 PACKET | Refills: 0 | Status: SHIPPED | OUTPATIENT
Start: 2019-07-03 | End: 2019-07-17

## 2019-07-03 RX ORDER — ONDANSETRON 2 MG/ML
INJECTION INTRAMUSCULAR; INTRAVENOUS AS NEEDED
Status: DISCONTINUED | OUTPATIENT
Start: 2019-07-03 | End: 2019-07-03 | Stop reason: SURG

## 2019-07-03 RX ORDER — HYDROMORPHONE HYDROCHLORIDE 1 MG/ML
0.6 INJECTION, SOLUTION INTRAMUSCULAR; INTRAVENOUS; SUBCUTANEOUS EVERY 5 MIN PRN
Status: DISCONTINUED | OUTPATIENT
Start: 2019-07-03 | End: 2019-07-03

## 2019-07-03 RX ORDER — MORPHINE SULFATE 2 MG/ML
2 INJECTION, SOLUTION INTRAMUSCULAR; INTRAVENOUS EVERY 10 MIN PRN
Status: DISCONTINUED | OUTPATIENT
Start: 2019-07-03 | End: 2019-07-03

## 2019-07-03 RX ORDER — SODIUM CHLORIDE, SODIUM LACTATE, POTASSIUM CHLORIDE, CALCIUM CHLORIDE 600; 310; 30; 20 MG/100ML; MG/100ML; MG/100ML; MG/100ML
INJECTION, SOLUTION INTRAVENOUS CONTINUOUS
Status: DISCONTINUED | OUTPATIENT
Start: 2019-07-03 | End: 2019-07-03

## 2019-07-03 RX ORDER — MORPHINE SULFATE 10 MG/ML
6 INJECTION, SOLUTION INTRAMUSCULAR; INTRAVENOUS EVERY 10 MIN PRN
Status: DISCONTINUED | OUTPATIENT
Start: 2019-07-03 | End: 2019-07-03

## 2019-07-03 RX ADMIN — ONDANSETRON 4 MG: 2 INJECTION INTRAMUSCULAR; INTRAVENOUS at 12:28:00

## 2019-07-03 RX ADMIN — GLYCOPYRROLATE 0.6 MG: 0.2 INJECTION INTRAMUSCULAR; INTRAVENOUS at 13:19:00

## 2019-07-03 RX ADMIN — ROCURONIUM BROMIDE 30 MG: 10 INJECTION, SOLUTION INTRAVENOUS at 12:24:00

## 2019-07-03 RX ADMIN — NEOSTIGMINE METHYLSULFATE 3 MG: 0.5 INJECTION INTRAVENOUS at 13:19:00

## 2019-07-03 RX ADMIN — SODIUM CHLORIDE, SODIUM LACTATE, POTASSIUM CHLORIDE, CALCIUM CHLORIDE: 600; 310; 30; 20 INJECTION, SOLUTION INTRAVENOUS at 12:19:00

## 2019-07-03 RX ADMIN — LIDOCAINE HYDROCHLORIDE 50 MG: 10 INJECTION, SOLUTION EPIDURAL; INFILTRATION; INTRACAUDAL; PERINEURAL at 12:24:00

## 2019-07-03 RX ADMIN — CEFAZOLIN SODIUM/WATER 2 G: 2 G/20 ML SYRINGE (ML) INTRAVENOUS at 12:28:00

## 2019-07-03 RX ADMIN — DEXAMETHASONE SODIUM PHOSPHATE 8 MG: 4 MG/ML VIAL (ML) INJECTION at 12:28:00

## 2019-07-03 RX ADMIN — SODIUM CHLORIDE, SODIUM LACTATE, POTASSIUM CHLORIDE, CALCIUM CHLORIDE: 600; 310; 30; 20 INJECTION, SOLUTION INTRAVENOUS at 13:22:00

## 2019-07-03 NOTE — OPERATIVE REPORT
Operative Report    Patient Name:  Mike Greene  MR:  [id number]  :  1975  DOS:  19    Preop Dx:  Calculus of gallbladder without cholecystitis without obstruction [K80.20]  Postop Dx:  Chronic cholecystitis  Procedure:  Laparoscopic cholec incised using hook cautery. The lower 1/3 of the gallbladder was dissected from the liver. Two structures, identified as the cystic duct and artery, are seen entering the infundibulum, thus obtaining the so called \"critical view of safety\".   The cystic

## 2019-07-03 NOTE — H&P
HPI:    Patient ID: Peg Foote is a 37year old female presenting with Patient presents with:  Gallbladder: Began having symptoms on 2/19/2019. Went to the ED. US done and stones and sludge was found.   No symptoms since, but does have epigastric pain file        Active member of club or organization: Not on file        Attends meetings of clubs or organizations: Not on file        Relationship status: Not on file      Intimate partner violence:        Fear of current or ex partner: Not on file        E Cardiovascular: Normal rate, regular rhythm and intact distal pulses. Pulmonary/Chest: Effort normal and breath sounds normal.   Abdominal: Soft. Normal appearance. She exhibits no distension and no mass. There is no tenderness.  There is no rebound an

## 2019-07-03 NOTE — ANESTHESIA PREPROCEDURE EVALUATION
Anesthesia PreOp Note    HPI:     Richi He is a 40year old female who presents for preoperative consultation requested by: Yunier Moran MD    Date of Surgery: 7/3/2019    Procedure(s):  LAPAROSCOPIC CHOLECYSTECTOMY  Indication: Calculus of gallbladder on file      Years of education: Not on file      Highest education level: Not on file    Occupational History      Not on file    Social Needs      Financial resource strain: Not on file      Food insecurity:        Worry: Not on file        Inability: No kg/m². height is 1.626 m (5' 4\") and weight is 64.9 kg (143 lb). Her oral temperature is 98 °F (36.7 °C). Her blood pressure is 133/79 and her pulse is 79. Her respiration is 17 and oxygen saturation is 98%.     03/22/19  1629 06/29/19  1220 07/03/19  10

## 2019-07-03 NOTE — ANESTHESIA PROCEDURE NOTES
Airway  Urgency: elective      General Information and Staff    Patient location during procedure: OR  Anesthesiologist: Haydee Mc MD  Resident/CRNA: Felipe Mcdowell CRNA  Performed: anesthesiologist     Indications and Patient Condition  Indica

## 2019-07-03 NOTE — ANESTHESIA POSTPROCEDURE EVALUATION
Patient: Vincenzo Freed    Procedure Summary     Date:  07/03/19 Room / Location:  [facility] OR 02 / [facility] OR    Anesthesia Start:  4120 Anesthesia Stop:  3661    Procedure:  LAPAROSCOPIC CHOLECYSTECTOMY (N/A Abdomen) Diagnosis:       Calculus of gallbla

## 2019-07-09 ENCOUNTER — TELEPHONE (OUTPATIENT)
Dept: SURGERY | Facility: CLINIC | Age: 44
End: 2019-07-09

## 2019-07-09 NOTE — TELEPHONE ENCOUNTER
Pt had sx. 7/3 and called to schedule 2week post op appt. Pt needs appt after 4pm and wants to know would it be okay to schedule appt for 7/23 due to it being over the 2 week post op period.  pls advise thank you

## 2019-07-23 ENCOUNTER — OFFICE VISIT (OUTPATIENT)
Dept: SURGERY | Facility: CLINIC | Age: 44
End: 2019-07-23
Payer: COMMERCIAL

## 2019-07-23 VITALS — BODY MASS INDEX: 23.56 KG/M2 | HEIGHT: 64 IN | WEIGHT: 138 LBS

## 2019-07-23 DIAGNOSIS — Z09 POSTOPERATIVE EXAMINATION: Primary | ICD-10-CM

## 2019-07-23 PROCEDURE — 99024 POSTOP FOLLOW-UP VISIT: CPT | Performed by: SURGERY

## 2019-07-25 NOTE — PROGRESS NOTES
S:  Lorenzo Kennedy is a 40year old female sp Lap Vianney  Doing well, tolerating a general diet with normal bowel habits      O:  Ht 5' 4\" (1.626 m)   Wt 138 lb (62.6 kg)   LMP 07/19/2019   BMI 23.69 kg/m²   GEN:  No acute distress  Abd:  Soft, NTND, incis

## 2019-09-05 ENCOUNTER — OFFICE VISIT (OUTPATIENT)
Dept: INTERNAL MEDICINE CLINIC | Facility: CLINIC | Age: 44
End: 2019-09-05
Payer: COMMERCIAL

## 2019-09-05 VITALS
SYSTOLIC BLOOD PRESSURE: 137 MMHG | DIASTOLIC BLOOD PRESSURE: 91 MMHG | TEMPERATURE: 98 F | HEIGHT: 64 IN | WEIGHT: 146 LBS | HEART RATE: 90 BPM | BODY MASS INDEX: 24.92 KG/M2

## 2019-09-05 DIAGNOSIS — R51.9 HEADACHE DISORDER: Primary | ICD-10-CM

## 2019-09-05 DIAGNOSIS — R42 DIZZINESS: ICD-10-CM

## 2019-09-05 PROCEDURE — 99214 OFFICE O/P EST MOD 30 MIN: CPT | Performed by: INTERNAL MEDICINE

## 2019-09-05 NOTE — PATIENT INSTRUCTIONS
1.  Try to get a good night sleep consistently. 2.  Begin taking Excedrin as needed for your migraine headaches. 3.  Begin taking meclizine 25 mg 3 times a day as needed for your dizziness.

## 2019-09-05 NOTE — PROGRESS NOTES
Patient ID: Danica Carroll is a 40year old female. Patient presents with:  Headache: HA and  random diziness x1 week        HISTORY OF PRESENT ILLNESS:   HPI  Patient presents for above.   Here with a weeklong history of global headaches and occasional d education: Not on file      Highest education level: Not on file    Occupational History      Not on file    Social Needs      Financial resource strain: Not on file      Food insecurity:        Worry: Not on file        Inability: Not on file      Transpo index is 25.06 kg/m². Physical Exam   Constitutional: She appears well-developed and well-nourished. HENT:   Head: Normocephalic and atraumatic.    Right Ear: External ear normal.   Left Ear: External ear normal.   Mouth/Throat: Oropharynx is clear and

## 2019-10-28 ENCOUNTER — TELEPHONE (OUTPATIENT)
Dept: OBGYN CLINIC | Facility: CLINIC | Age: 44
End: 2019-10-28

## 2019-10-28 ENCOUNTER — APPOINTMENT (OUTPATIENT)
Dept: LAB | Age: 44
End: 2019-10-28
Attending: OBSTETRICS & GYNECOLOGY
Payer: COMMERCIAL

## 2019-10-28 DIAGNOSIS — R30.0 DYSURIA: Primary | ICD-10-CM

## 2019-10-28 PROCEDURE — 81001 URINALYSIS AUTO W/SCOPE: CPT | Performed by: OBSTETRICS & GYNECOLOGY

## 2019-10-28 PROCEDURE — 87086 URINE CULTURE/COLONY COUNT: CPT | Performed by: OBSTETRICS & GYNECOLOGY

## 2019-10-28 RX ORDER — SULFAMETHOXAZOLE AND TRIMETHOPRIM 800; 160 MG/1; MG/1
1 TABLET ORAL 2 TIMES DAILY
Qty: 6 TABLET | Refills: 0 | Status: SHIPPED | OUTPATIENT
Start: 2019-10-28 | End: 2019-10-31

## 2019-10-28 NOTE — TELEPHONE ENCOUNTER
Letter approved by Provider. Please have patient go to lab for a U/A C & S prior to starting the Bactrim DS.

## 2019-10-28 NOTE — TELEPHONE ENCOUNTER
Pt Name and  verified. Pt states that she is currently experiencing dysuria and frequency. States that symptoms began yesterday morning. Denies any fever. Per UTI protocol this RN to send Rx to pharmacy for Bactrim DS 1 tab po BID x 3 days.  RN

## 2019-10-28 NOTE — TELEPHONE ENCOUNTER
Pt informed that letter was approved and advised to go to lab and get U/A and Urine culture done prior to starting abx. Pt verbalized understanding. Letter generated and faxed to number provided by pt.

## 2019-11-11 ENCOUNTER — OFFICE VISIT (OUTPATIENT)
Dept: INTERNAL MEDICINE CLINIC | Facility: CLINIC | Age: 44
End: 2019-11-11
Payer: COMMERCIAL

## 2019-11-11 VITALS
DIASTOLIC BLOOD PRESSURE: 80 MMHG | SYSTOLIC BLOOD PRESSURE: 131 MMHG | TEMPERATURE: 98 F | HEIGHT: 64 IN | BODY MASS INDEX: 25.27 KG/M2 | WEIGHT: 148 LBS | HEART RATE: 90 BPM

## 2019-11-11 DIAGNOSIS — R51.9 HEADACHE DISORDER: ICD-10-CM

## 2019-11-11 DIAGNOSIS — F41.9 ANXIETY: ICD-10-CM

## 2019-11-11 DIAGNOSIS — R03.0 ELEVATED BP WITHOUT DIAGNOSIS OF HYPERTENSION: Primary | ICD-10-CM

## 2019-11-11 PROCEDURE — 90471 IMMUNIZATION ADMIN: CPT | Performed by: INTERNAL MEDICINE

## 2019-11-11 PROCEDURE — 99214 OFFICE O/P EST MOD 30 MIN: CPT | Performed by: INTERNAL MEDICINE

## 2019-11-11 PROCEDURE — 90686 IIV4 VACC NO PRSV 0.5 ML IM: CPT | Performed by: INTERNAL MEDICINE

## 2019-11-11 RX ORDER — ALPRAZOLAM 0.25 MG/1
0.25 TABLET ORAL 2 TIMES DAILY PRN
Qty: 40 TABLET | Refills: 0 | Status: SHIPPED | OUTPATIENT
Start: 2019-11-11 | End: 2020-02-10

## 2019-11-18 NOTE — PROGRESS NOTES
HPI:    Patient ID: Amanda Villatoro is a 40year old female. Blood Pressure   Associated symptoms include headaches.  Pertinent negatives include no abdominal pain, chest pain, chills, coughing, fatigue, fever, joint swelling, nausea, rash, sore throat, v Hematological: Negative for adenopathy. Does not bruise/bleed easily. Psychiatric/Behavioral: Negative for agitation and behavioral problems. The patient is nervous/anxious.           Current Outpatient Medications   Medication Sig Dispense Refill   • A Neck: Neck supple. No thyromegaly present. Cardiovascular: Normal rate, regular rhythm, S1 normal, S2 normal, normal heart sounds and intact distal pulses. Exam reveals no gallop. No murmur heard.   Pulmonary/Chest: Effort normal and breath sounds nor

## 2020-01-24 ENCOUNTER — TELEPHONE (OUTPATIENT)
Dept: INTERNAL MEDICINE CLINIC | Facility: CLINIC | Age: 45
End: 2020-01-24

## 2020-01-24 DIAGNOSIS — Z12.31 VISIT FOR SCREENING MAMMOGRAM: Primary | ICD-10-CM

## 2020-01-29 NOTE — TELEPHONE ENCOUNTER
Dr. Bettina Colin Pt would like order for Mammo. Last mammo was 8/22/18. Last OV with you 11/11/19. Pt is scheduled for Px with you on 2/10/2020. Please advise order pending.

## 2020-02-01 ENCOUNTER — APPOINTMENT (OUTPATIENT)
Dept: LAB | Age: 45
End: 2020-02-01
Attending: INTERNAL MEDICINE
Payer: COMMERCIAL

## 2020-02-01 DIAGNOSIS — Z00.00 ROUTINE GENERAL MEDICAL EXAMINATION AT A HEALTH CARE FACILITY: ICD-10-CM

## 2020-02-01 LAB
BACTERIA UR QL AUTO: NEGATIVE /HPF
RBC #/AREA URNS AUTO: 1 /HPF
WBC #/AREA URNS AUTO: 2 /HPF

## 2020-02-01 PROCEDURE — 84443 ASSAY THYROID STIM HORMONE: CPT | Performed by: INTERNAL MEDICINE

## 2020-02-01 PROCEDURE — 80053 COMPREHEN METABOLIC PANEL: CPT | Performed by: INTERNAL MEDICINE

## 2020-02-01 PROCEDURE — 83036 HEMOGLOBIN GLYCOSYLATED A1C: CPT | Performed by: INTERNAL MEDICINE

## 2020-02-01 PROCEDURE — 80061 LIPID PANEL: CPT | Performed by: INTERNAL MEDICINE

## 2020-02-01 PROCEDURE — 36415 COLL VENOUS BLD VENIPUNCTURE: CPT | Performed by: INTERNAL MEDICINE

## 2020-02-01 PROCEDURE — 84439 ASSAY OF FREE THYROXINE: CPT | Performed by: INTERNAL MEDICINE

## 2020-02-01 PROCEDURE — 85027 COMPLETE CBC AUTOMATED: CPT | Performed by: INTERNAL MEDICINE

## 2020-02-01 PROCEDURE — 81015 MICROSCOPIC EXAM OF URINE: CPT

## 2020-02-10 ENCOUNTER — OFFICE VISIT (OUTPATIENT)
Dept: INTERNAL MEDICINE CLINIC | Facility: CLINIC | Age: 45
End: 2020-02-10
Payer: COMMERCIAL

## 2020-02-10 VITALS
WEIGHT: 150 LBS | BODY MASS INDEX: 25.61 KG/M2 | HEART RATE: 77 BPM | TEMPERATURE: 99 F | DIASTOLIC BLOOD PRESSURE: 79 MMHG | HEIGHT: 64 IN | SYSTOLIC BLOOD PRESSURE: 120 MMHG

## 2020-02-10 DIAGNOSIS — Z12.31 VISIT FOR SCREENING MAMMOGRAM: ICD-10-CM

## 2020-02-10 DIAGNOSIS — Z00.00 PHYSICAL EXAM: Primary | ICD-10-CM

## 2020-02-10 PROCEDURE — 99396 PREV VISIT EST AGE 40-64: CPT | Performed by: INTERNAL MEDICINE

## 2020-02-10 RX ORDER — ALPRAZOLAM 0.25 MG/1
0.25 TABLET ORAL 2 TIMES DAILY PRN
Qty: 40 TABLET | Refills: 0 | Status: SHIPPED | OUTPATIENT
Start: 2020-02-10 | End: 2020-05-19

## 2020-02-21 NOTE — PROGRESS NOTES
HPI:    Patient ID: Madhav Zuniga is a 40year old female.     HPI     Physical exam    Generally healthy    S/p lap choly 7/2019  Doing well    Pap smear  /HPV normal 3/2019 per gyne  DR Kelly Ventura    Will forllowu p with gyne    /79 (BP Location: Rig 98 - 112 mmol/L 112    Carbon Dioxide, Total      21.0 - 32.0 mmol/L 28.0    ANION GAP      0 - 18 mmol/L 4    BUN      7 - 18 mg/dL 6 (L)    CREATININE      0.55 - 1.02 mg/dL 0.79    BUN/CREAT Ratio      10.0 - 20.0 7.6 (L)    CALCIUM      8.5 - 10.1 mg/d • History of pregnancy , , [address] [facility], [address] [facility], ,      x3; 5 hr labor , 12 hr labor- , 5 hr labor- ,     • Visual impairment     REader      Past Surgical History:   Procedure Laterality Date   • D & C     • LAPAROS General: No tenderness or edema. Lymphadenopathy:     She has no cervical adenopathy. Neurological: She is alert. Skin: No rash noted. She is not diaphoretic. No erythema. Nursing note and vitals reviewed.            ASSESSMENT/PLAN:   (Z00.00) Addie

## 2020-02-22 ENCOUNTER — HOSPITAL ENCOUNTER (OUTPATIENT)
Dept: MAMMOGRAPHY | Age: 45
Discharge: HOME | End: 2020-02-22
Attending: INTERNAL MEDICINE
Payer: COMMERCIAL

## 2020-02-22 DIAGNOSIS — Z12.31 VISIT FOR SCREENING MAMMOGRAM: ICD-10-CM

## 2020-02-22 PROCEDURE — 77063 BREAST TOMOSYNTHESIS BI: CPT | Performed by: INTERNAL MEDICINE

## 2020-02-22 PROCEDURE — 77067 SCR MAMMO BI INCL CAD: CPT | Performed by: INTERNAL MEDICINE

## 2020-05-18 ENCOUNTER — NURSE TRIAGE (OUTPATIENT)
Dept: INTERNAL MEDICINE CLINIC | Facility: CLINIC | Age: 45
End: 2020-05-18

## 2020-05-18 NOTE — TELEPHONE ENCOUNTER
Action Requested: Summary for Provider     []  Critical Lab, Recommendations Needed  [] Need Additional Advice  []   FYI    []   Need Orders  [] Need Medications Sent to Pharmacy  []  Other     SUMMARY: Asking if she can be prescribed Zoloft again.  Says it

## 2020-05-19 NOTE — PROGRESS NOTES
Virtual Telephone Check-In    Paolo Velazquez verbally consents to a Virtual/Telephone Check-In visit on 05/19/20. Patient understands and accepts financial responsibility for any deductible, co-insurance and/or co-pays associated with this service.     D Hematological: Negative for adenopathy. Does not bruise/bleed easily. Psychiatric/Behavioral: Positive for decreased concentration. Negative for agitation and behavioral problems. The patient is nervous/anxious.           Current Outpatient Medications Dioxide, Total      21.0 - 32.0 mmol/L 28.0    ANION GAP      0 - 18 mmol/L 4    BUN      7 - 18 mg/dL 6 (L)    CREATININE      0.55 - 1.02 mg/dL 0.79    BUN/CREAT Ratio      10.0 - 20.0 7.6 (L)    CALCIUM      8.5 - 10.1 mg/dL 8.8    CALCULATED OSMOLALITY labs discussed  Medication side effects discussed    Follow up in a month      Meds This Visit:  Requested Prescriptions     Signed Prescriptions Disp Refills   • ALPRAZolam (XANAX) 0.25 MG Oral Tab 60 tablet 0     Sig: Take 1 tablet (0.25 mg total) by stella

## 2020-08-03 ENCOUNTER — TELEPHONE (OUTPATIENT)
Dept: INTERNAL MEDICINE CLINIC | Facility: CLINIC | Age: 45
End: 2020-08-03

## 2020-08-04 RX ORDER — ALPRAZOLAM 0.25 MG/1
TABLET ORAL
Qty: 60 TABLET | Refills: 0 | Status: SHIPPED | OUTPATIENT
Start: 2020-08-04 | End: 2022-05-18

## 2020-08-04 NOTE — TELEPHONE ENCOUNTER
Pt calling back and states needs refill for alprazolam and sertraline.     See 8/4/20 refill request.

## 2020-08-31 ENCOUNTER — TELEPHONE (OUTPATIENT)
Dept: OBGYN CLINIC | Facility: CLINIC | Age: 45
End: 2020-08-31

## 2020-08-31 DIAGNOSIS — R30.0 DYSURIA: Primary | ICD-10-CM

## 2020-08-31 RX ORDER — SULFAMETHOXAZOLE AND TRIMETHOPRIM 800; 160 MG/1; MG/1
1 TABLET ORAL 2 TIMES DAILY
Qty: 6 TABLET | Refills: 1 | Status: SHIPPED | OUTPATIENT
Start: 2020-08-31 | End: 2020-09-03

## 2020-08-31 RX ORDER — PHENAZOPYRIDINE HYDROCHLORIDE 200 MG/1
200 TABLET, FILM COATED ORAL 3 TIMES DAILY PRN
Qty: 9 TABLET | Refills: 0 | Status: SHIPPED | OUTPATIENT
Start: 2020-08-31 | End: 2021-04-01

## 2020-08-31 NOTE — TELEPHONE ENCOUNTER
Pt Name and  verified. Pt reports burning with urination, urinary urgency and frequency. Pt denies fever or low back pain.   Pt states that she has noticed some hematuria and is requesting a medication for the pain she is having along with an antibiot

## 2021-04-01 ENCOUNTER — OFFICE VISIT (OUTPATIENT)
Dept: OBGYN CLINIC | Facility: CLINIC | Age: 46
End: 2021-04-01
Payer: COMMERCIAL

## 2021-04-01 VITALS — WEIGHT: 154 LBS | BODY MASS INDEX: 26 KG/M2 | DIASTOLIC BLOOD PRESSURE: 81 MMHG | SYSTOLIC BLOOD PRESSURE: 126 MMHG

## 2021-04-01 DIAGNOSIS — Z01.419 ENCOUNTER FOR GYNECOLOGICAL EXAMINATION WITHOUT ABNORMAL FINDING: ICD-10-CM

## 2021-04-01 DIAGNOSIS — Z12.31 SCREENING MAMMOGRAM, ENCOUNTER FOR: Primary | ICD-10-CM

## 2021-04-01 PROCEDURE — 3074F SYST BP LT 130 MM HG: CPT | Performed by: OBSTETRICS & GYNECOLOGY

## 2021-04-01 PROCEDURE — 3079F DIAST BP 80-89 MM HG: CPT | Performed by: OBSTETRICS & GYNECOLOGY

## 2021-04-01 PROCEDURE — 99396 PREV VISIT EST AGE 40-64: CPT | Performed by: OBSTETRICS & GYNECOLOGY

## 2021-04-01 NOTE — PROGRESS NOTES
HPI/Subjective:   Patient ID: Geena Burris is a 39year old female. Patient here for routine exam.  No C/Os. Menses regular. Paragard IUD in place since 4/2012. Patient aware that Paragard IUD needs to be replaced next April.   Paragard IUD surveill Lymphadenopathy:      Cervical: No cervical adenopathy. Skin:     General: Skin is warm and dry. Neurological:      General: No focal deficit present. Mental Status: She is alert and oriented to person, place, and time. Psychiatric:          Gerson Bowie

## 2021-04-09 DIAGNOSIS — Z23 NEED FOR VACCINATION: ICD-10-CM

## 2021-04-21 ENCOUNTER — OFFICE VISIT (OUTPATIENT)
Dept: INTERNAL MEDICINE CLINIC | Facility: CLINIC | Age: 46
End: 2021-04-21
Payer: COMMERCIAL

## 2021-04-21 VITALS
WEIGHT: 158.25 LBS | RESPIRATION RATE: 16 BRPM | HEIGHT: 64 IN | HEART RATE: 85 BPM | BODY MASS INDEX: 27.02 KG/M2 | SYSTOLIC BLOOD PRESSURE: 134 MMHG | TEMPERATURE: 98 F | DIASTOLIC BLOOD PRESSURE: 87 MMHG

## 2021-04-21 DIAGNOSIS — Z00.00 PHYSICAL EXAM: Primary | ICD-10-CM

## 2021-04-21 DIAGNOSIS — F41.8 DEPRESSION WITH ANXIETY: ICD-10-CM

## 2021-04-21 DIAGNOSIS — E55.9 VITAMIN D DEFICIENCY: ICD-10-CM

## 2021-04-21 PROCEDURE — 3075F SYST BP GE 130 - 139MM HG: CPT | Performed by: INTERNAL MEDICINE

## 2021-04-21 PROCEDURE — 3008F BODY MASS INDEX DOCD: CPT | Performed by: INTERNAL MEDICINE

## 2021-04-21 PROCEDURE — 99396 PREV VISIT EST AGE 40-64: CPT | Performed by: INTERNAL MEDICINE

## 2021-04-21 PROCEDURE — 3079F DIAST BP 80-89 MM HG: CPT | Performed by: INTERNAL MEDICINE

## 2021-04-21 RX ORDER — ALPRAZOLAM 0.25 MG/1
0.25 TABLET ORAL NIGHTLY PRN
Qty: 40 TABLET | Refills: 0 | Status: SHIPPED | OUTPATIENT
Start: 2021-04-21 | End: 2021-09-30

## 2021-04-24 ENCOUNTER — LAB ENCOUNTER (OUTPATIENT)
Dept: LAB | Age: 46
End: 2021-04-24
Attending: OBSTETRICS & GYNECOLOGY
Payer: COMMERCIAL

## 2021-04-24 ENCOUNTER — HOSPITAL ENCOUNTER (OUTPATIENT)
Dept: MAMMOGRAPHY | Age: 46
Discharge: HOME | End: 2021-04-24
Attending: OBSTETRICS & GYNECOLOGY
Payer: COMMERCIAL

## 2021-04-24 DIAGNOSIS — Z00.00 PHYSICAL EXAM: ICD-10-CM

## 2021-04-24 DIAGNOSIS — Z12.31 SCREENING MAMMOGRAM, ENCOUNTER FOR: ICD-10-CM

## 2021-04-24 PROCEDURE — 83036 HEMOGLOBIN GLYCOSYLATED A1C: CPT | Performed by: INTERNAL MEDICINE

## 2021-04-24 PROCEDURE — 84443 ASSAY THYROID STIM HORMONE: CPT | Performed by: INTERNAL MEDICINE

## 2021-04-24 PROCEDURE — 80061 LIPID PANEL: CPT | Performed by: INTERNAL MEDICINE

## 2021-04-24 PROCEDURE — 85027 COMPLETE CBC AUTOMATED: CPT | Performed by: INTERNAL MEDICINE

## 2021-04-24 PROCEDURE — 36415 COLL VENOUS BLD VENIPUNCTURE: CPT | Performed by: INTERNAL MEDICINE

## 2021-04-24 PROCEDURE — 82607 VITAMIN B-12: CPT | Performed by: INTERNAL MEDICINE

## 2021-04-24 PROCEDURE — 84439 ASSAY OF FREE THYROXINE: CPT | Performed by: INTERNAL MEDICINE

## 2021-04-24 PROCEDURE — 77063 BREAST TOMOSYNTHESIS BI: CPT | Performed by: OBSTETRICS & GYNECOLOGY

## 2021-04-24 PROCEDURE — 82306 VITAMIN D 25 HYDROXY: CPT | Performed by: INTERNAL MEDICINE

## 2021-04-24 PROCEDURE — 81015 MICROSCOPIC EXAM OF URINE: CPT

## 2021-04-24 PROCEDURE — 77067 SCR MAMMO BI INCL CAD: CPT | Performed by: OBSTETRICS & GYNECOLOGY

## 2021-04-24 PROCEDURE — 82746 ASSAY OF FOLIC ACID SERUM: CPT | Performed by: INTERNAL MEDICINE

## 2021-04-24 PROCEDURE — 80053 COMPREHEN METABOLIC PANEL: CPT | Performed by: INTERNAL MEDICINE

## 2021-04-30 NOTE — PROGRESS NOTES
HPI:    Patient ID: Dhiraj Lloyd is a 39year old female.     HPI    Physical exam  Generally healthy    /87   Pulse 85   Temp 98.4 °F (36.9 °C) (Tympanic)   Resp 16   Ht 5' 4\" (1.626 m)   Wt 158 lb 4 oz (71.8 kg)   LMP 04/11/2021   BMI 27.16 kg/m² 0   • folic acid 1 MG Oral Tab Take 1 tablet (1 mg total) by mouth daily. 90 tablet 0   • ergocalciferol 1.25 MG (42802 UT) Oral Cap Take 1 capsule (50,000 Units total) by mouth once a week.  12 capsule 1   • ALPRAZOLAM 0.25 MG Oral Tab TAKE 1 TABLET(0.25 M Rate and Rhythm: Normal rate and regular rhythm. Heart sounds: Normal heart sounds, S1 normal and S2 normal. No murmur heard. No gallop. Pulmonary:      Effort: Pulmonary effort is normal. No respiratory distress.       Breath sounds: Normal breat Ancelmo Judge) 0.25 MG Oral Tab 40 tablet 0     Sig: Take 1 tablet (0.25 mg total) by mouth nightly as needed for Anxiety.        Imaging & Referrals:  None        SE#[id number]

## 2021-07-30 ENCOUNTER — TELEMEDICINE (OUTPATIENT)
Dept: INTERNAL MEDICINE CLINIC | Facility: CLINIC | Age: 46
End: 2021-07-30
Payer: COMMERCIAL

## 2021-07-30 ENCOUNTER — HOSPITAL ENCOUNTER (EMERGENCY)
Facility: HOSPITAL | Age: 46
Discharge: HOME | End: 2021-07-31
Attending: EMERGENCY MEDICINE
Payer: COMMERCIAL

## 2021-07-30 ENCOUNTER — TELEPHONE (OUTPATIENT)
Dept: INTERNAL MEDICINE CLINIC | Facility: CLINIC | Age: 46
End: 2021-07-30

## 2021-07-30 ENCOUNTER — APPOINTMENT (OUTPATIENT)
Dept: GENERAL RADIOLOGY | Facility: HOSPITAL | Age: 46
End: 2021-07-30
Payer: COMMERCIAL

## 2021-07-30 DIAGNOSIS — J01.00 ACUTE NON-RECURRENT MAXILLARY SINUSITIS: ICD-10-CM

## 2021-07-30 DIAGNOSIS — K21.9 GASTROESOPHAGEAL REFLUX DISEASE, UNSPECIFIED WHETHER ESOPHAGITIS PRESENT: ICD-10-CM

## 2021-07-30 DIAGNOSIS — U07.1 COVID-19: Primary | ICD-10-CM

## 2021-07-30 DIAGNOSIS — J06.9 UPPER RESPIRATORY TRACT INFECTION, UNSPECIFIED TYPE: Primary | ICD-10-CM

## 2021-07-30 LAB — AMB EXT COVID-19 RESULT: DETECTED

## 2021-07-30 PROCEDURE — 99214 OFFICE O/P EST MOD 30 MIN: CPT | Performed by: INTERNAL MEDICINE

## 2021-07-30 PROCEDURE — 71045 X-RAY EXAM CHEST 1 VIEW: CPT | Performed by: EMERGENCY MEDICINE

## 2021-07-30 PROCEDURE — 96374 THER/PROPH/DIAG INJ IV PUSH: CPT

## 2021-07-30 PROCEDURE — 84484 ASSAY OF TROPONIN QUANT: CPT | Performed by: EMERGENCY MEDICINE

## 2021-07-30 PROCEDURE — 80048 BASIC METABOLIC PNL TOTAL CA: CPT | Performed by: EMERGENCY MEDICINE

## 2021-07-30 PROCEDURE — 96361 HYDRATE IV INFUSION ADD-ON: CPT

## 2021-07-30 PROCEDURE — 93005 ELECTROCARDIOGRAM TRACING: CPT

## 2021-07-30 PROCEDURE — 96375 TX/PRO/DX INJ NEW DRUG ADDON: CPT

## 2021-07-30 PROCEDURE — 99285 EMERGENCY DEPT VISIT HI MDM: CPT

## 2021-07-30 PROCEDURE — 85025 COMPLETE CBC W/AUTO DIFF WBC: CPT | Performed by: EMERGENCY MEDICINE

## 2021-07-30 PROCEDURE — 93010 ELECTROCARDIOGRAM REPORT: CPT | Performed by: EMERGENCY MEDICINE

## 2021-07-30 RX ORDER — AMOXICILLIN AND CLAVULANATE POTASSIUM 875; 125 MG/1; MG/1
1 TABLET, FILM COATED ORAL 2 TIMES DAILY WITH MEALS
Qty: 10 TABLET | Refills: 0 | Status: SHIPPED | OUTPATIENT
Start: 2021-07-30 | End: 2021-08-04

## 2021-07-30 RX ORDER — CODEINE PHOSPHATE AND GUAIFENESIN 10; 100 MG/5ML; MG/5ML
5 SOLUTION ORAL EVERY 6 HOURS PRN
Qty: 240 ML | Refills: 0 | Status: SHIPPED | OUTPATIENT
Start: 2021-07-30

## 2021-07-30 RX ORDER — FLUTICASONE PROPIONATE 50 MCG
2 SPRAY, SUSPENSION (ML) NASAL DAILY
Qty: 3 EACH | Refills: 0 | Status: SHIPPED | OUTPATIENT
Start: 2021-07-30 | End: 2022-07-25

## 2021-07-30 NOTE — TELEPHONE ENCOUNTER
Action Requested: Summary for Provider     []  Critical Lab, Recommendations Needed  [] Need Additional Advice  []   FYI    []   Need Orders  [] Need Medications Sent to Pharmacy  []  Other     SUMMARY: virtual visit today with Dr Aneta Silva for eval body aches

## 2021-07-30 NOTE — PROGRESS NOTES
Telehealth Visit      I spoke with Josef Sierra by secure [employer] video service on 07/30/21, verified date of birth, patient stated they are in the state of [place], and discussed their concerns as below:   DELFINA Jordan    7/30/21 1 cough. Negative for shortness of breath and wheezing. Cardiovascular: Negative for chest pain. Gastrointestinal: Negative for abdominal pain and diarrhea. Musculoskeletal: Positive for myalgias.    Allergic/Immunologic: Negative for immunocompromised daily with meals for 5 days. 10 tablet 0   • guaiFENesin-codeine (CHERATUSSIN AC) 100-10 MG/5ML Oral Solution Take 5 mL by mouth every 6 (six) hours as needed for cough or congestion.  240 mL 0   • Fluticasone Propionate 50 MCG/ACT Nasal Suspension 2 sprays patient. Reviewed Social History:  Social History    Tobacco Use      Smoking status: Never Smoker      Smokeless tobacco: Never Used    Vaping Use      Vaping Use: Never used    Alcohol use: Yes      Comment: beer & liquor socially    Drug use:  No detailed in the plan of care above.   Coding/billing information is submitted for this visit based on complexity of care and/or time spent for the visit.      ----------------------------------------- PATIENT INSTRUCTIONS------------------------------------

## 2021-07-30 NOTE — TELEPHONE ENCOUNTER
Patient called stating she is covid positive. Informed patient per provider visit yesterday not take the amoxicillin. Patient verbalized understanding and agrees with plan of care.

## 2021-07-31 VITALS
BODY MASS INDEX: 23.9 KG/M2 | RESPIRATION RATE: 19 BRPM | HEIGHT: 64 IN | TEMPERATURE: 99 F | WEIGHT: 140 LBS | OXYGEN SATURATION: 98 % | DIASTOLIC BLOOD PRESSURE: 76 MMHG | SYSTOLIC BLOOD PRESSURE: 127 MMHG | HEART RATE: 84 BPM

## 2021-07-31 LAB
ANION GAP SERPL CALC-SCNC: 7 MMOL/L (ref 0–18)
BASOPHILS # BLD AUTO: 0.02 X10(3) UL (ref 0–0.2)
BASOPHILS NFR BLD AUTO: 0.4 %
BUN BLD-MCNC: 7 MG/DL (ref 7–18)
BUN/CREAT SERPL: 11.3 (ref 10–20)
CALCIUM BLD-MCNC: 8.2 MG/DL (ref 8.5–10.1)
CHLORIDE SERPL-SCNC: 109 MMOL/L (ref 98–112)
CO2 SERPL-SCNC: 24 MMOL/L (ref 21–32)
CREAT BLD-MCNC: 0.62 MG/DL
DEPRECATED RDW RBC AUTO: 43.8 FL (ref 35.1–46.3)
EOSINOPHIL # BLD AUTO: 0.18 X10(3) UL (ref 0–0.7)
EOSINOPHIL NFR BLD AUTO: 3.6 %
ERYTHROCYTE [DISTWIDTH] IN BLOOD BY AUTOMATED COUNT: 12.7 % (ref 11–15)
GLUCOSE BLD-MCNC: 106 MG/DL (ref 70–99)
HCT VFR BLD AUTO: 39.4 %
HGB BLD-MCNC: 13.4 G/DL
IMM GRANULOCYTES # BLD AUTO: 0.01 X10(3) UL (ref 0–1)
IMM GRANULOCYTES NFR BLD: 0.2 %
LYMPHOCYTES # BLD AUTO: 2.27 X10(3) UL (ref 1–4)
LYMPHOCYTES NFR BLD AUTO: 44.8 %
MCH RBC QN AUTO: 32.2 PG (ref 26–34)
MCHC RBC AUTO-ENTMCNC: 34 G/DL (ref 31–37)
MCV RBC AUTO: 94.7 FL
MONOCYTES # BLD AUTO: 0.77 X10(3) UL (ref 0.1–1)
MONOCYTES NFR BLD AUTO: 15.2 %
NEUTROPHILS # BLD AUTO: 1.82 X10 (3) UL (ref 1.5–7.7)
NEUTROPHILS # BLD AUTO: 1.82 X10(3) UL (ref 1.5–7.7)
NEUTROPHILS NFR BLD AUTO: 35.8 %
OSMOLALITY SERPL CALC.SUM OF ELEC: 288 MOSM/KG (ref 275–295)
PLATELET # BLD AUTO: 199 10(3)UL (ref 150–450)
POTASSIUM SERPL-SCNC: 3.1 MMOL/L (ref 3.5–5.1)
RBC # BLD AUTO: 4.16 X10(6)UL
SODIUM SERPL-SCNC: 140 MMOL/L (ref 136–145)
TROPONIN I SERPL-MCNC: <0.045 NG/ML (ref ?–0.04)
WBC # BLD AUTO: 5.1 X10(3) UL (ref 4–11)

## 2021-07-31 PROCEDURE — S0028 INJECTION, FAMOTIDINE, 20 MG: HCPCS | Performed by: EMERGENCY MEDICINE

## 2021-07-31 RX ORDER — MAGNESIUM HYDROXIDE/ALUMINUM HYDROXICE/SIMETHICONE 120; 1200; 1200 MG/30ML; MG/30ML; MG/30ML
10 SUSPENSION ORAL 4 TIMES DAILY PRN
Qty: 355 ML | Refills: 0 | Status: SHIPPED | OUTPATIENT
Start: 2021-07-31

## 2021-07-31 RX ORDER — ONDANSETRON 2 MG/ML
4 INJECTION INTRAMUSCULAR; INTRAVENOUS ONCE
Status: COMPLETED | OUTPATIENT
Start: 2021-07-31 | End: 2021-07-31

## 2021-07-31 RX ORDER — FAMOTIDINE 10 MG/ML
20 INJECTION, SOLUTION INTRAVENOUS ONCE
Status: COMPLETED | OUTPATIENT
Start: 2021-07-31 | End: 2021-07-31

## 2021-07-31 RX ORDER — FAMOTIDINE 20 MG/1
20 TABLET ORAL 2 TIMES DAILY PRN
Qty: 30 TABLET | Refills: 0 | Status: SHIPPED | OUTPATIENT
Start: 2021-07-31 | End: 2021-08-30

## 2021-07-31 NOTE — ED INITIAL ASSESSMENT (HPI)
Pt c/o of mid upper gastric pain for about an hour. Pt states she was dx w/covid and was prescribed robutissin w/codeine and after taking pain started.

## 2021-07-31 NOTE — ED PROVIDER NOTES
Patient Seen in: [facility] AND [facility] Emergency Department      History   Patient presents with:  Covid  Chest Pain Angina    Stated Complaint: chest pain    HPI/Subjective:   HPI    49-year-old female with recent diagnosis of COVID-19, symptomatic for the All other systems reviewed and negative except as noted above.     Physical Exam     ED Triage Vitals [07/30/21 4957]   BP (!) 164/109   Pulse 97   Resp 22   Temp 97.7 °F (36.5 °C)   Temp src Temporal   SpO2 100 %   O2 Device None (Room air)       Criselda Sodium 140 136 - 145 mmol/L    Potassium 3.1 (L) 3.5 - 5.1 mmol/L    Chloride 109 98 - 112 mmol/L    CO2 24.0 21.0 - 32.0 mmol/L    Anion Gap 7 0 - 18 mmol/L    BUN 7 7 - 18 mg/dL    Creatinine 0.62 0.55 - 1.02 mg/dL    BUN/CREA Ratio 11.3 10.0 - 20.0    C (extension [phone]). If you can not reach me at this number, do not leave a voicemail. Please call the same number, Ext [phone] and ask for an available Radiologist.            Mona Zapata M.D.   This report has been electronically signed and verified by the [facility] presenting problem including COVID 19, pneumonia, GERD, ACS, anxiety.     Critical Care Time:  Total critical care time for this patient was 32 minutes exclusive of separately billable procedures, but in obtaining history, performing a physical exam, bedsid (four) times daily as needed.   Qty: 355 mL Refills: 0

## 2021-07-31 NOTE — TELEPHONE ENCOUNTER
What  was your temp today? - afebrile    How did you take your temp?     with an oral thermometer    What was your pulse ox today? Patient does not have the device     Are you feeling short of breath today?    No      Is the shortness of breath better, the

## 2021-07-31 NOTE — ED QUICK NOTES
Pt provided and explained d/c instructions, at-home care, follow-up, and rx. Pt in nad at this time. Iv access d/c. Vss. Bazzi. Ambulatory. A&ox3. Belongings with pt. All questions and concerns addressed.

## 2021-08-02 ENCOUNTER — TELEPHONE (OUTPATIENT)
Dept: CASE MANAGEMENT | Age: 46
End: 2021-08-02

## 2021-08-02 NOTE — TELEPHONE ENCOUNTER
Patrice Ortez, RN routed conversation to [facility] Rn Triage 1 hour ago (7:31 AM)   Patrice Ortez RN 1 hour ago (7:31 AM)   AD     Pt received PAB infusion at ED on 7/30 for COVID-19. Please follow-up with pt for post-infusion assessment and home monitoring.   Eleazar Gonzalez

## 2021-08-02 NOTE — TELEPHONE ENCOUNTER
Pt received PAB infusion at ED on 7/30 for COVID-19. Please follow-up with pt for post-infusion assessment and home monitoring. Thank you.

## 2021-08-04 ENCOUNTER — TELEPHONE (OUTPATIENT)
Dept: INTERNAL MEDICINE CLINIC | Facility: CLINIC | Age: 46
End: 2021-08-04

## 2021-08-04 NOTE — TELEPHONE ENCOUNTER
Patient seen in ER on 8/30 and diagnosed with Covid and had a PAB infusion. States she's very fatigued and nauseous, especially after eating a meal and also has diarrhea. Informed patient to eat a bland diet for now - BRAT, and to push fluids.   Patient

## 2021-08-05 NOTE — TELEPHONE ENCOUNTER
Please also see note from 8/4/21 separate encounter. Day 6/10 of home monitoring post infusion. What  was your temp today? -    Denies chills or sweats. 90.1    How did you take your temp?    forehead    What was your pulse ox today?     Denies hav

## 2021-08-06 NOTE — TELEPHONE ENCOUNTER
What  was your temp today? - 92.3F. Advised patient to recheck it by keeping thermometer still on her forehead. How did you take your temp?     forehead    What was your pulse ox today? Does not have one    Call was disconnected. Call attempt.  Left V

## 2021-08-07 NOTE — TELEPHONE ENCOUNTER
Dr. Huan Nava - Patient requesting a pain medication for headache. Pain interferes with her ability to sleep. Pharmacy verified. Please advise, thank you. Please reply to gracie: EM RN TRIAGE    Please do not sign/close this encounter.    Keep open for Home Is the cough better, the same, or worse than yesterday? better  Denies chest pain. Are you experiencing weakness today?    Yes    Is the weakness better, the same or worse than yesterday?     about the same    How is your appetite compared to yes

## 2021-08-09 ENCOUNTER — TELEMEDICINE (OUTPATIENT)
Dept: INTERNAL MEDICINE CLINIC | Facility: CLINIC | Age: 46
End: 2021-08-09
Payer: COMMERCIAL

## 2021-08-09 DIAGNOSIS — U09.9 POST-COVID SYNDROME: Primary | ICD-10-CM

## 2021-08-09 DIAGNOSIS — G44.89 OTHER HEADACHE SYNDROME: ICD-10-CM

## 2021-08-09 PROCEDURE — 99443 PHONE E/M BY PHYS 21-30 MIN: CPT | Performed by: INTERNAL MEDICINE

## 2021-08-09 RX ORDER — BUTALBITAL, ACETAMINOPHEN AND CAFFEINE 300; 40; 50 MG/1; MG/1; MG/1
1 CAPSULE ORAL EVERY 6 HOURS PRN
Qty: 30 CAPSULE | Refills: 0 | Status: SHIPPED | OUTPATIENT
Start: 2021-08-09

## 2021-08-09 RX ORDER — ONDANSETRON 4 MG/1
4 TABLET, ORALLY DISINTEGRATING ORAL EVERY 8 HOURS PRN
Qty: 20 TABLET | Refills: 1 | Status: SHIPPED | OUTPATIENT
Start: 2021-08-09 | End: 2021-08-16

## 2021-08-09 NOTE — TELEPHONE ENCOUNTER
Day 10 covid +, MAB infusion on 7//30/21    Dr Lamberto Delong, patient had telehealth visit today. Would you like to discontinue home monitoring ?   Please reply to pool: EM TRIAGE SUPPORT

## 2021-08-09 NOTE — PROGRESS NOTES
Virtual Telephone Check-In    Elmira Lord verbally consents to a Virtual/Telephone Check-In visit on 08/09/21. Patient has been referred to the [facility] website at [email or web address] to review the yearly Consent to Treat document.     Patient Noah Day needed for Heartburn. 30 tablet 0   • Alum & Mag Hydroxide-Simeth [phone] MG/5ML Oral Suspension Take 10 mL by mouth 4 (four) times daily as needed.  355 mL 0   • guaiFENesin-codeine (CHERATUSSIN AC) 100-10 MG/5ML Oral Solution Take 5 mL by mouth every 6 AS NEEDED FOR SLEEP (Patient not taking: Reported on 4/21/2021) 60 tablet 0     Allergies:No Known Allergies    HISTORY:  Past Medical History:   Diagnosis Date   • Advanced maternal age (AMA) in pregnancy    • AMA (advanced maternal age) multigravida 33+

## 2021-08-10 NOTE — TELEPHONE ENCOUNTER
What  was your temp today? - patient states she does not have a fever    How did you take your temp?     without a thermometer    What was your pulse ox today? 97 HR 86    Are you feeling short of breath today?    No      Is the shortness of breath better,

## 2021-08-11 NOTE — TELEPHONE ENCOUNTER
Left message to call back. Still need to home monitor for few more days per Dr. Caden Goetz, due to headache.     Please don't sign encounter

## 2021-09-09 NOTE — ED NOTES
Continued NPO for planned EGD. Per RN likely appropriate tomorrow. Will plan for eval tomorrow    Pt resting on ED cart in no distress. Updated on care.

## 2021-09-10 ENCOUNTER — TELEPHONE (OUTPATIENT)
Dept: INTERNAL MEDICINE CLINIC | Facility: CLINIC | Age: 46
End: 2021-09-10

## 2021-09-10 NOTE — TELEPHONE ENCOUNTER
Pt stated that she tested positive for covid and wanted to know when can she get the covid vaccine. Pt did received the  PAB infusion so she was informed she will have to wait 90 days from when she got the PAB infusion. Pt verbalized understanding.

## 2021-10-04 ENCOUNTER — NURSE TRIAGE (OUTPATIENT)
Dept: INTERNAL MEDICINE CLINIC | Facility: CLINIC | Age: 46
End: 2021-10-04

## 2021-10-04 NOTE — TELEPHONE ENCOUNTER
Letty Naylor pt was called and informed of your message below and she has made a appt for tomorrow. Can you please see the message below regarding the vaccine question. Pt wants to know if she can received her 1st covid vaccine 2 weeks earlier before the 90 days since she received the monoclonal antibody. She is going on vacation and wants to know if she can go get her 1st covid vaccine 2 weeks early.

## 2021-10-04 NOTE — TELEPHONE ENCOUNTER
Patient states she was diagnosed with acid reflux, asking for medication. Also asking if she is okay to receive the COVID-19 vaccine? Patient received PAB infusion.

## 2021-10-04 NOTE — TELEPHONE ENCOUNTER
Luz Bell for Dr. Singh Gillespie.  Pt stated that she is completely out of her Xanax she will like a refill please advise

## 2021-10-05 ENCOUNTER — OFFICE VISIT (OUTPATIENT)
Dept: INTERNAL MEDICINE CLINIC | Facility: CLINIC | Age: 46
End: 2021-10-05
Payer: COMMERCIAL

## 2021-10-05 VITALS
HEIGHT: 64 IN | WEIGHT: 151 LBS | SYSTOLIC BLOOD PRESSURE: 134 MMHG | RESPIRATION RATE: 99 BRPM | DIASTOLIC BLOOD PRESSURE: 80 MMHG | BODY MASS INDEX: 25.78 KG/M2 | HEART RATE: 87 BPM

## 2021-10-05 DIAGNOSIS — K21.9 GASTROESOPHAGEAL REFLUX DISEASE WITHOUT ESOPHAGITIS: ICD-10-CM
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
## 2021-10-05 DIAGNOSIS — Z12.11 COLON CANCER SCREENING: ICD-10-CM

## 2021-10-05 PROCEDURE — 3075F SYST BP GE 130 - 139MM HG: CPT | Performed by: NURSE PRACTITIONER

## 2021-10-05 PROCEDURE — 3079F DIAST BP 80-89 MM HG: CPT | Performed by: NURSE PRACTITIONER

## 2021-10-05 PROCEDURE — 3008F BODY MASS INDEX DOCD: CPT | Performed by: NURSE PRACTITIONER

## 2021-10-05 PROCEDURE — 99214 OFFICE O/P EST MOD 30 MIN: CPT | Performed by: NURSE PRACTITIONER

## 2021-10-05 RX ORDER — ALPRAZOLAM 0.25 MG/1
0.25 TABLET ORAL NIGHTLY PRN
Qty: 40 TABLET | Refills: 0 | Status: SHIPPED | OUTPATIENT
Start: 2021-10-05

## 2021-10-05 RX ORDER — ALPRAZOLAM 0.25 MG/1
0.25 TABLET ORAL 2 TIMES DAILY PRN
Qty: 60 TABLET | Refills: 0 | OUTPATIENT
Start: 2021-10-05

## 2021-10-05 RX ORDER — OMEPRAZOLE 40 MG/1
40 CAPSULE, DELAYED RELEASE ORAL DAILY
Qty: 30 CAPSULE | Refills: 1 | Status: SHIPPED | OUTPATIENT
Start: 2021-10-05

## 2021-10-05 RX ORDER — ALPRAZOLAM 0.25 MG/1
0.25 TABLET ORAL NIGHTLY PRN
Qty: 40 TABLET | Refills: 0 | OUTPATIENT
Start: 2021-10-05

## 2021-10-05 NOTE — PROGRESS NOTES
HPI:    Patient ID: Dhiraj Lloyd is a 55year old female. Patient of Dr. Gini Jain seen for the first time. Hypertension  Patient is here for follow up of hypertension. BP at home: not checking. Has been compliant with medications.   Exercise level: 10:38 AM    TRIG 116 04/24/2021 10:38 AM     (H) 04/24/2021 10:38 AM    NONHDLC 167 (H) 04/24/2021 10:38 AM          Presents to the clinic with complaints of acid reflux x1 week. Has 2 cokes daily, eats spicy and greasy foods stopped Friday.  Rated and sore throat. +head injury/pain   Eyes: Negative. Respiratory: Negative. Negative for cough, choking and wheezing. Cardiovascular: Negative. Negative for chest pain. Gastrointestinal: Positive for heartburn and nausea.  Negative for abd pregnancy    • AMA (advanced maternal age) multigravida 35+    • High blood pressure     borderline    • History of pregnancy , , [facility], ,      x3; 5 hr labor , 12 hr labor- , 5 hr labor- ,     • Visual Radial pulses are 2+ on the right side and 2+ on the left side. Dorsalis pedis pulses are 2+ on the right side and 2+ on the left side. Posterior tibial pulses are 2+ on the right side and 2+ on the left side.       Heart sounds: Normal heart omeprazole 1 tablet daily 30 minutes before breakfast.  -avoid caffeinated or carbonated beverages, fried foods, spicy foods or highly acidic foods (citrus, onions, tomatoes, etc)  -don’t lay down 20-30 minutes after eating or drinking  -use wedge pillow u

## 2021-10-05 NOTE — PATIENT INSTRUCTIONS
ASSESSMENT/PLAN:   Fall, initial encounter  (primary encounter diagnosis)  Ordered CT of head  Okay to take Tylenol 500 mg every 6 hours as needed for pain  Any dizziness or worsening of symptoms go to ER.     Gastroesophageal reflux disease without esoph

## 2021-10-20 ENCOUNTER — TELEPHONE (OUTPATIENT)
Dept: CASE MANAGEMENT | Age: 46
End: 2021-10-20

## 2021-10-20 NOTE — TELEPHONE ENCOUNTER
Good Morning Dr Yanely Trent and staff,    Erica - CPT 83897 - the service and the facility requested are not covered     You may conduct a peer to peer on patients behalf with Alan Becerril.     Phone# [phone], Option 4    Case# [id number]    Thank you,    Aftab Cool

## 2021-10-25 ENCOUNTER — NURSE TRIAGE (OUTPATIENT)
Dept: INTERNAL MEDICINE CLINIC | Facility: CLINIC | Age: 46
End: 2021-10-25

## 2021-10-25 NOTE — TELEPHONE ENCOUNTER
Patient called and advised she is having Neck Pain, on the Left Side. She said this happened a few years ago and was given Ibuprofen (she thinks that is the medication) for the inflammation.  She was wondering if she could have that sent to her Pharmacy aga

## 2021-10-25 NOTE — TELEPHONE ENCOUNTER
Action Requested: Summary for Provider     []  Critical Lab, Recommendations Needed  [] Need Additional Advice  []   FYI    []   Need Orders  [] Need Medications Sent to Pharmacy  []  Other     SUMMARY:Pt requesting muscle relaxer, decline appt, stated she

## 2021-10-27 NOTE — TELEPHONE ENCOUNTER
The patient was called and informed. She stated she does not think she needs the scan. She is feeling better. No nausea, vomiting change in vision. Per the patient is ok now. Instructed to call back if develops symptoms.

## 2021-10-27 NOTE — TELEPHONE ENCOUNTER
Spoke to Dr. Fior Cohen, for peer to peer regarding CT scan. Due to fall being less than 3 feet and location of CT entered was [address] [place] [address] location request for CT scan was denied by insurance. Also Ct was under wrong provider.  Advised by Ins possibly consider MRI o

## 2022-05-02 ENCOUNTER — TELEPHONE (OUTPATIENT)
Dept: INTERNAL MEDICINE CLINIC | Facility: CLINIC | Age: 47
End: 2022-05-02

## 2022-05-02 NOTE — TELEPHONE ENCOUNTER
Patient is calling to request annual lab orders. Patient scheduled px with PCP on 5/18/22 and would like to get labs before visit for review at visit. Patient is also requesting a mammogram order. Please advise when confirmed.

## 2022-05-02 NOTE — TELEPHONE ENCOUNTER
Please place lab orders  Scheduled 5/18/22    Last mammogram 4/24/21  Mammogram ordered per protocol

## 2022-05-18 ENCOUNTER — OFFICE VISIT (OUTPATIENT)
Dept: INTERNAL MEDICINE CLINIC | Facility: CLINIC | Age: 47
End: 2022-05-18
Payer: COMMERCIAL

## 2022-05-18 VITALS
HEART RATE: 87 BPM | HEIGHT: 64 IN | BODY MASS INDEX: 25.44 KG/M2 | DIASTOLIC BLOOD PRESSURE: 77 MMHG | WEIGHT: 149 LBS | SYSTOLIC BLOOD PRESSURE: 128 MMHG

## 2022-05-18 DIAGNOSIS — D22.9 NUMEROUS MOLES: ICD-10-CM

## 2022-05-18 DIAGNOSIS — Z00.00 PHYSICAL EXAM: Primary | ICD-10-CM

## 2022-05-18 DIAGNOSIS — Z12.11 COLON CANCER SCREENING: ICD-10-CM

## 2022-05-18 DIAGNOSIS — Z12.31 VISIT FOR SCREENING MAMMOGRAM: ICD-10-CM

## 2022-05-18 PROCEDURE — 3078F DIAST BP <80 MM HG: CPT | Performed by: INTERNAL MEDICINE

## 2022-05-18 PROCEDURE — 3008F BODY MASS INDEX DOCD: CPT | Performed by: INTERNAL MEDICINE

## 2022-05-18 PROCEDURE — 3074F SYST BP LT 130 MM HG: CPT | Performed by: INTERNAL MEDICINE

## 2022-05-18 PROCEDURE — 99396 PREV VISIT EST AGE 40-64: CPT | Performed by: INTERNAL MEDICINE

## 2022-05-18 RX ORDER — ALPRAZOLAM 0.25 MG/1
0.25 TABLET ORAL NIGHTLY PRN
Qty: 40 TABLET | Refills: 0 | Status: SHIPPED | OUTPATIENT
Start: 2022-05-18

## 2022-06-01 ENCOUNTER — OFFICE VISIT (OUTPATIENT)
Dept: OBGYN CLINIC | Facility: CLINIC | Age: 47
End: 2022-06-01
Payer: COMMERCIAL

## 2022-06-01 VITALS
HEART RATE: 71 BPM | BODY MASS INDEX: 25 KG/M2 | SYSTOLIC BLOOD PRESSURE: 132 MMHG | DIASTOLIC BLOOD PRESSURE: 80 MMHG | WEIGHT: 148.38 LBS

## 2022-06-01 DIAGNOSIS — Z01.419 ENCOUNTER FOR GYNECOLOGICAL EXAMINATION WITHOUT ABNORMAL FINDING: ICD-10-CM

## 2022-06-01 DIAGNOSIS — Z01.419 ENCOUNTER FOR WELL WOMAN EXAM WITH ROUTINE GYNECOLOGICAL EXAM: Primary | ICD-10-CM

## 2022-06-01 PROCEDURE — 99396 PREV VISIT EST AGE 40-64: CPT | Performed by: OBSTETRICS & GYNECOLOGY

## 2022-06-01 PROCEDURE — 3079F DIAST BP 80-89 MM HG: CPT | Performed by: OBSTETRICS & GYNECOLOGY

## 2022-06-01 PROCEDURE — 3075F SYST BP GE 130 - 139MM HG: CPT | Performed by: OBSTETRICS & GYNECOLOGY

## 2022-06-02 LAB — HPV I/H RISK 1 DNA SPEC QL NAA+PROBE: NEGATIVE

## 2022-06-04 ENCOUNTER — LAB ENCOUNTER (OUTPATIENT)
Dept: LAB | Age: 47
End: 2022-06-04
Attending: INTERNAL MEDICINE
Payer: COMMERCIAL

## 2022-06-04 DIAGNOSIS — Z00.00 PHYSICAL EXAM: Primary | ICD-10-CM

## 2022-06-04 LAB
ALBUMIN SERPL-MCNC: 3.8 G/DL (ref 3.4–5)
ALBUMIN/GLOB SERPL: 1.2 {RATIO} (ref 1–2)
ALP LIVER SERPL-CCNC: 59 U/L
ALT SERPL-CCNC: 56 U/L
ANION GAP SERPL CALC-SCNC: 3 MMOL/L (ref 0–18)
AST SERPL-CCNC: 19 U/L (ref 15–37)
BILIRUB SERPL-MCNC: 0.6 MG/DL (ref 0.1–2)
BILIRUB UR QL: NEGATIVE
BUN BLD-MCNC: 10 MG/DL (ref 7–18)
BUN/CREAT SERPL: 14.7 (ref 10–20)
CALCIUM BLD-MCNC: 9 MG/DL (ref 8.5–10.1)
CHLORIDE SERPL-SCNC: 109 MMOL/L (ref 98–112)
CHOLEST SERPL-MCNC: 188 MG/DL (ref ?–200)
CO2 SERPL-SCNC: 27 MMOL/L (ref 21–32)
COLOR UR: YELLOW
CREAT BLD-MCNC: 0.68 MG/DL
DEPRECATED RDW RBC AUTO: 41.5 FL (ref 35.1–46.3)
ERYTHROCYTE [DISTWIDTH] IN BLOOD BY AUTOMATED COUNT: 12.1 % (ref 11–15)
EST. AVERAGE GLUCOSE BLD GHB EST-MCNC: 91 MG/DL (ref 68–126)
FASTING PATIENT LIPID ANSWER: YES
FASTING STATUS PATIENT QL REPORTED: YES
GLOBULIN PLAS-MCNC: 3.1 G/DL (ref 2.8–4.4)
GLUCOSE BLD-MCNC: 97 MG/DL (ref 70–99)
GLUCOSE UR-MCNC: NEGATIVE MG/DL
HBA1C MFR BLD: 4.8 % (ref ?–5.7)
HCT VFR BLD AUTO: 37.5 %
HDLC SERPL-MCNC: 49 MG/DL (ref 40–59)
HGB BLD-MCNC: 12.9 G/DL
KETONES UR-MCNC: NEGATIVE MG/DL
LDLC SERPL CALC-MCNC: 121 MG/DL (ref ?–100)
LEUKOCYTE ESTERASE UR QL STRIP.AUTO: NEGATIVE
MCH RBC QN AUTO: 32.9 PG (ref 26–34)
MCHC RBC AUTO-ENTMCNC: 34.4 G/DL (ref 31–37)
MCV RBC AUTO: 95.7 FL
NITRITE UR QL STRIP.AUTO: NEGATIVE
NONHDLC SERPL-MCNC: 139 MG/DL (ref ?–130)
OSMOLALITY SERPL CALC.SUM OF ELEC: 287 MOSM/KG (ref 275–295)
PH UR: 6 [PH] (ref 5–8)
PLATELET # BLD AUTO: 267 10(3)UL (ref 150–450)
POTASSIUM SERPL-SCNC: 3.8 MMOL/L (ref 3.5–5.1)
PROT SERPL-MCNC: 6.9 G/DL (ref 6.4–8.2)
PROT UR-MCNC: NEGATIVE MG/DL
RBC # BLD AUTO: 3.92 X10(6)UL
SODIUM SERPL-SCNC: 139 MMOL/L (ref 136–145)
SP GR UR STRIP: 1.02 (ref 1–1.03)
T4 FREE SERPL-MCNC: 1.1 NG/DL (ref 0.8–1.7)
TRIGL SERPL-MCNC: 98 MG/DL (ref 30–149)
TSI SER-ACNC: 1.41 MIU/ML (ref 0.36–3.74)
UROBILINOGEN UR STRIP-ACNC: <2
VIT C UR-MCNC: NEGATIVE MG/DL
VLDLC SERPL CALC-MCNC: 17 MG/DL (ref 0–30)
WBC # BLD AUTO: 7.7 X10(3) UL (ref 4–11)

## 2022-06-04 PROCEDURE — 84443 ASSAY THYROID STIM HORMONE: CPT | Performed by: INTERNAL MEDICINE

## 2022-06-04 PROCEDURE — 81001 URINALYSIS AUTO W/SCOPE: CPT | Performed by: INTERNAL MEDICINE

## 2022-06-04 PROCEDURE — 83036 HEMOGLOBIN GLYCOSYLATED A1C: CPT | Performed by: INTERNAL MEDICINE

## 2022-06-04 PROCEDURE — 84439 ASSAY OF FREE THYROXINE: CPT | Performed by: INTERNAL MEDICINE

## 2022-06-04 PROCEDURE — 80053 COMPREHEN METABOLIC PANEL: CPT | Performed by: INTERNAL MEDICINE

## 2022-06-04 PROCEDURE — 85027 COMPLETE CBC AUTOMATED: CPT | Performed by: INTERNAL MEDICINE

## 2022-06-04 PROCEDURE — 80061 LIPID PANEL: CPT

## 2022-06-04 PROCEDURE — 36415 COLL VENOUS BLD VENIPUNCTURE: CPT | Performed by: INTERNAL MEDICINE

## 2022-06-11 ENCOUNTER — HOSPITAL ENCOUNTER (OUTPATIENT)
Dept: MAMMOGRAPHY | Age: 47
Discharge: HOME | End: 2022-06-11
Attending: INTERNAL MEDICINE
Payer: COMMERCIAL

## 2022-06-11 DIAGNOSIS — Z12.31 SCREENING MAMMOGRAM, ENCOUNTER FOR: ICD-10-CM

## 2022-06-11 PROCEDURE — 77067 SCR MAMMO BI INCL CAD: CPT | Performed by: INTERNAL MEDICINE

## 2022-06-11 PROCEDURE — 77063 BREAST TOMOSYNTHESIS BI: CPT | Performed by: INTERNAL MEDICINE

## 2022-06-28 ENCOUNTER — TELEPHONE (OUTPATIENT)
Dept: OBGYN CLINIC | Facility: CLINIC | Age: 47
End: 2022-06-28

## 2022-06-28 NOTE — TELEPHONE ENCOUNTER
Paragard IUD was placed  March 2012 unable to schedule for IUD exchange until 8/2 would like to know if needs to use back up contraception in the interim. Routing to provider to advise.

## 2022-08-02 ENCOUNTER — OFFICE VISIT (OUTPATIENT)
Dept: OBGYN CLINIC | Facility: CLINIC | Age: 47
End: 2022-08-02
Payer: COMMERCIAL

## 2022-08-02 VITALS
DIASTOLIC BLOOD PRESSURE: 80 MMHG | SYSTOLIC BLOOD PRESSURE: 130 MMHG | HEIGHT: 64 IN | BODY MASS INDEX: 25.41 KG/M2 | HEART RATE: 83 BPM | WEIGHT: 148.81 LBS

## 2022-08-02 DIAGNOSIS — Z30.433 ENCOUNTER FOR IUD REMOVAL AND REINSERTION: Primary | ICD-10-CM

## 2022-08-02 DIAGNOSIS — Z30.433 ENCOUNTER FOR REMOVAL AND REINSERTION OF INTRAUTERINE CONTRACEPTIVE DEVICE (IUD): ICD-10-CM

## 2022-08-02 DIAGNOSIS — Z30.430 ENCOUNTER FOR INSERTION OF INTRAUTERINE CONTRACEPTIVE DEVICE (IUD): ICD-10-CM

## 2022-08-02 LAB
CONTROL LINE PRESENT WITH A CLEAR BACKGROUND (YES/NO): YES YES/NO
PREGNANCY TEST, URINE: NEGATIVE

## 2022-08-02 PROCEDURE — 58301 REMOVE INTRAUTERINE DEVICE: CPT | Performed by: OBSTETRICS & GYNECOLOGY

## 2022-08-02 PROCEDURE — 81025 URINE PREGNANCY TEST: CPT | Performed by: OBSTETRICS & GYNECOLOGY

## 2022-08-02 PROCEDURE — 3079F DIAST BP 80-89 MM HG: CPT | Performed by: OBSTETRICS & GYNECOLOGY

## 2022-08-02 PROCEDURE — 3075F SYST BP GE 130 - 139MM HG: CPT | Performed by: OBSTETRICS & GYNECOLOGY

## 2022-08-02 PROCEDURE — 3008F BODY MASS INDEX DOCD: CPT | Performed by: OBSTETRICS & GYNECOLOGY

## 2022-08-02 PROCEDURE — 58300 INSERT INTRAUTERINE DEVICE: CPT | Performed by: OBSTETRICS & GYNECOLOGY

## 2022-08-02 RX ORDER — COPPER 313.4 MG/1
1 INTRAUTERINE DEVICE INTRAUTERINE ONCE
Status: COMPLETED | OUTPATIENT
Start: 2022-08-02 | End: 2022-08-02

## 2022-08-02 RX ADMIN — COPPER 1 DEVICE: 313.4 INTRAUTERINE DEVICE INTRAUTERINE at 16:54:00

## 2022-08-02 NOTE — PROCEDURES
IUD Removal     Pregnancy Results: negative from urine test   Birth control method(s) used:   Paragard IUD    Consent signed. Procedure discussed with the patient in detail including indication, risks, benefits, alternatives and complications. Pelvic Exam Findings:  Lesion description: none    Procedure:  Speculum placed in the vagina. Betadine wash of vagina and cervix. An Allis clamp used to grasp IUD strings. Paragard IUD was removed without difficulty. The patient tolerated the procedure well. Visit Plan:  Discharge instructions were reviewed with the patient. IUD Insertion     Pregnancy Results: negative from urine test   Birth control method(s) used: Paragard IUD    Consent signed. Procedure discussed with the patient in detail including indication, risks, benefits, alternatives and complications. Pelvic Exam Findings:  Lesion description: none    Procedure:  Speculum placed in the vagina. Betadine wash of vagina and cervix. Single tooth tenaculum was placed at the 12 o'clock position. Uterus sounded to 8 cm. Paragard IUD was placed without difficulty. Strings cut at 3 cm. Single tooth tenaculum removed. Silver nitrate used to achieve hemostasis. Good hemostasis noted. Patient tolerated procedure well. Visit Plan:  IUD surveillance was discussed with the patient.

## 2022-08-18 RX ORDER — ALPRAZOLAM 0.25 MG/1
0.25 TABLET ORAL NIGHTLY PRN
Qty: 40 TABLET | Refills: 0 | Status: SHIPPED | OUTPATIENT
Start: 2022-08-18

## 2022-10-14 RX ORDER — ALPRAZOLAM 0.25 MG/1
0.25 TABLET ORAL NIGHTLY PRN
Qty: 40 TABLET | Refills: 0 | Status: CANCELLED | OUTPATIENT
Start: 2022-10-14

## 2022-10-14 NOTE — TELEPHONE ENCOUNTER
Please review refill protocol failed/ no protocol  Requested Prescriptions   Pending Prescriptions Disp Refills    ALPRAZOLAM 0.25 MG Oral Tab [Pharmacy Med Name: ALPRAZOLAM 0.25MG TABLETS] 40 tablet 0     Sig: TAKE 1 TABLET(0.25 MG) BY MOUTH EVERY NIGHT AS NEEDED FOR ANXIETY        There is no refill protocol information for this order

## 2022-10-14 NOTE — TELEPHONE ENCOUNTER
Please review. Protocol failed or has no protocol. Requested Prescriptions   Pending Prescriptions Disp Refills    ALPRAZolam (XANAX) 0.25 MG Oral Tab 40 tablet 0     Sig: Take 1 tablet (0.25 mg total) by mouth nightly as needed for Anxiety.         There is no refill protocol information for this order           Recent Outpatient Visits              2 months ago Encounter for IUD removal and reinsertion    [address], [address] [place] [address], Jena Maldonado MD    Office Visit    4 months ago Encounter for well woman exam with routine gynecological exam    [address], [address] [place], [place] [address]Jena MD    Office Visit    4 months ago Physical exam    Steven Ocampo MD    Office Visit    1 year ago Fall, initial encounter    [address], [address], [address], APRN    Office Visit    1 year ago Post-COVID syndrome    Steven Ocampo MD    Telemedicine

## 2022-10-17 RX ORDER — ALPRAZOLAM 0.25 MG/1
0.25 TABLET ORAL NIGHTLY PRN
Qty: 40 TABLET | Refills: 0 | Status: SHIPPED | OUTPATIENT
Start: 2022-10-17

## 2022-10-17 RX ORDER — ALPRAZOLAM 0.25 MG/1
0.25 TABLET ORAL NIGHTLY PRN
Qty: 40 TABLET | Refills: 0 | Status: SHIPPED | OUTPATIENT
Start: 2022-10-17 | End: 2022-10-17

## 2022-12-23 NOTE — TELEPHONE ENCOUNTER
Please review. Protocol Failed or has No Protocol. Pt requesting for 40 tablets    Requested Prescriptions   Pending Prescriptions Disp Refills    ALPRAZolam (XANAX) 0.25 MG Oral Tab 40 tablet 0     Sig: Take 1 tablet (0.25 mg total) by mouth nightly as needed for Anxiety.        There is no refill protocol information for this order              Recent Outpatient Visits              4 months ago Encounter for IUD removal and reinsertion    [address], [address] [place], [place] [address]Becca MD    Office Visit    6 months ago Encounter for well woman exam with routine gynecological exam    [address], [address] [place], [place] [address]Becca MD    Office Visit    7 months ago Physical exam    Winifred Georges MD    Office Visit    1 year ago Fall, initial encounter    [address], [address], [address], APRN    Office Visit    1 year ago Post-COVID syndrome    Winifred Georges MD    Telemedicine

## 2022-12-27 RX ORDER — ALPRAZOLAM 0.25 MG/1
0.25 TABLET ORAL NIGHTLY PRN
Qty: 40 TABLET | Refills: 0 | Status: SHIPPED | OUTPATIENT
Start: 2022-12-27

## 2023-03-17 NOTE — TELEPHONE ENCOUNTER
Patient is requesting a nurse to give her a call back to discuss medication [2+] : left foot dorsalis pedis 2+ [Sensation] : the sensory exam was normal to light touch and pinprick [No Focal Deficits] : no focal deficits [Deep Tendon Reflexes (DTR)] : deep tendon reflexes were 2+ and symmetric [Motor Exam] : the motor exam was normal [Delayed in the Right Toes] : capillary refills normal in right toes [Delayed in the Left Toes] : capillary refills normal in the left toes [FreeTextEntry3] : Hair growth noted on digits. Proximal to distal cooling is within normal limits.  [de-identified] : The pain at the 2nd, 3rd metatarsal necks and at the 4th metatarsal base is essentially resolved. There is mild swelling that is sensitive at the 2nd and 3rd metatarsal region and there is minor fasciitis and strain of the plantar fascia central band.  [FreeTextEntry1] : There is minimal swelling at the 2nd and 3rd MPJ's. No other areas of swelling.

## 2023-05-10 RX ORDER — ALPRAZOLAM 0.25 MG/1
0.25 TABLET ORAL NIGHTLY PRN
Qty: 30 TABLET | Refills: 0 | OUTPATIENT
Start: 2023-05-10

## 2023-08-22 ENCOUNTER — NURSE TRIAGE (OUTPATIENT)
Dept: INTERNAL MEDICINE CLINIC | Facility: CLINIC | Age: 48
End: 2023-08-22

## 2023-08-22 ENCOUNTER — APPOINTMENT (OUTPATIENT)
Dept: MRI IMAGING | Facility: HOSPITAL | Age: 48
End: 2023-08-22
Attending: EMERGENCY MEDICINE
Payer: COMMERCIAL

## 2023-08-22 ENCOUNTER — HOSPITAL ENCOUNTER (EMERGENCY)
Facility: HOSPITAL | Age: 48
Discharge: HOME | End: 2023-08-22
Attending: EMERGENCY MEDICINE
Payer: COMMERCIAL

## 2023-08-22 ENCOUNTER — APPOINTMENT (OUTPATIENT)
Dept: GENERAL RADIOLOGY | Facility: HOSPITAL | Age: 48
End: 2023-08-22
Attending: EMERGENCY MEDICINE
Payer: COMMERCIAL

## 2023-08-22 VITALS
TEMPERATURE: 98 F | BODY MASS INDEX: 25.61 KG/M2 | WEIGHT: 150 LBS | HEART RATE: 72 BPM | HEIGHT: 64 IN | DIASTOLIC BLOOD PRESSURE: 92 MMHG | OXYGEN SATURATION: 100 % | SYSTOLIC BLOOD PRESSURE: 144 MMHG | RESPIRATION RATE: 14 BRPM

## 2023-08-22 DIAGNOSIS — R07.9 CHEST PAIN OF UNCERTAIN ETIOLOGY: Primary | ICD-10-CM

## 2023-08-22 DIAGNOSIS — R20.2 PARESTHESIAS: ICD-10-CM

## 2023-08-22 LAB
ALBUMIN SERPL-MCNC: 3.6 G/DL (ref 3.4–5)
ALBUMIN/GLOB SERPL: 1.1 {RATIO} (ref 1–2)
ALP LIVER SERPL-CCNC: 70 U/L
ALT SERPL-CCNC: 41 U/L
ANION GAP SERPL CALC-SCNC: 5 MMOL/L (ref 0–18)
AST SERPL-CCNC: 14 U/L (ref 15–37)
ATRIAL RATE: 81 BPM
B-HCG UR QL: NEGATIVE
BASOPHILS # BLD AUTO: 0.04 X10(3) UL (ref 0–0.2)
BASOPHILS NFR BLD AUTO: 0.5 %
BILIRUB SERPL-MCNC: 0.4 MG/DL (ref 0.1–2)
BUN BLD-MCNC: 8 MG/DL (ref 7–18)
BUN/CREAT SERPL: 11.1 (ref 10–20)
CALCIUM BLD-MCNC: 8.5 MG/DL (ref 8.5–10.1)
CHLORIDE SERPL-SCNC: 112 MMOL/L (ref 98–112)
CO2 SERPL-SCNC: 25 MMOL/L (ref 21–32)
CREAT BLD-MCNC: 0.72 MG/DL
D DIMER PPP FEU-MCNC: <0.27 UG/ML FEU (ref ?–0.5)
DEPRECATED RDW RBC AUTO: 42.9 FL (ref 35.1–46.3)
EGFRCR SERPLBLD CKD-EPI 2021: 103 ML/MIN/1.73M2 (ref 60–?)
EOSINOPHIL # BLD AUTO: 0.3 X10(3) UL (ref 0–0.7)
EOSINOPHIL NFR BLD AUTO: 3.4 %
ERYTHROCYTE [DISTWIDTH] IN BLOOD BY AUTOMATED COUNT: 12.5 % (ref 11–15)
GLOBULIN PLAS-MCNC: 3.2 G/DL (ref 2.8–4.4)
GLUCOSE BLD-MCNC: 110 MG/DL (ref 70–99)
HCT VFR BLD AUTO: 38.1 %
HGB BLD-MCNC: 13.2 G/DL
IMM GRANULOCYTES # BLD AUTO: 0.02 X10(3) UL (ref 0–1)
IMM GRANULOCYTES NFR BLD: 0.2 %
LYMPHOCYTES # BLD AUTO: 3.03 X10(3) UL (ref 1–4)
LYMPHOCYTES NFR BLD AUTO: 34.3 %
MCH RBC QN AUTO: 32.6 PG (ref 26–34)
MCHC RBC AUTO-ENTMCNC: 34.6 G/DL (ref 31–37)
MCV RBC AUTO: 94.1 FL
MONOCYTES # BLD AUTO: 0.46 X10(3) UL (ref 0.1–1)
MONOCYTES NFR BLD AUTO: 5.2 %
NEUTROPHILS # BLD AUTO: 4.99 X10 (3) UL (ref 1.5–7.7)
NEUTROPHILS # BLD AUTO: 4.99 X10(3) UL (ref 1.5–7.7)
NEUTROPHILS NFR BLD AUTO: 56.4 %
OSMOLALITY SERPL CALC.SUM OF ELEC: 293 MOSM/KG (ref 275–295)
P AXIS: 40 DEGREES
P-R INTERVAL: 140 MS
PLATELET # BLD AUTO: 280 10(3)UL (ref 150–450)
POTASSIUM SERPL-SCNC: 3.4 MMOL/L (ref 3.5–5.1)
PROT SERPL-MCNC: 6.8 G/DL (ref 6.4–8.2)
Q-T INTERVAL: 366 MS
QRS DURATION: 76 MS
QTC CALCULATION (BEZET): 425 MS
R AXIS: 21 DEGREES
RBC # BLD AUTO: 4.05 X10(6)UL
SODIUM SERPL-SCNC: 142 MMOL/L (ref 136–145)
T AXIS: 30 DEGREES
TROPONIN I HIGH SENSITIVITY: 6 NG/L
VENTRICULAR RATE: 81 BPM
WBC # BLD AUTO: 8.8 X10(3) UL (ref 4–11)

## 2023-08-22 PROCEDURE — 81025 URINE PREGNANCY TEST: CPT

## 2023-08-22 PROCEDURE — 85025 COMPLETE CBC W/AUTO DIFF WBC: CPT

## 2023-08-22 PROCEDURE — 85025 COMPLETE CBC W/AUTO DIFF WBC: CPT | Performed by: EMERGENCY MEDICINE

## 2023-08-22 PROCEDURE — 80053 COMPREHEN METABOLIC PANEL: CPT | Performed by: EMERGENCY MEDICINE

## 2023-08-22 PROCEDURE — 93005 ELECTROCARDIOGRAM TRACING: CPT

## 2023-08-22 PROCEDURE — 80053 COMPREHEN METABOLIC PANEL: CPT

## 2023-08-22 PROCEDURE — 84484 ASSAY OF TROPONIN QUANT: CPT | Performed by: EMERGENCY MEDICINE

## 2023-08-22 PROCEDURE — 85379 FIBRIN DEGRADATION QUANT: CPT | Performed by: EMERGENCY MEDICINE

## 2023-08-22 PROCEDURE — 71045 X-RAY EXAM CHEST 1 VIEW: CPT | Performed by: EMERGENCY MEDICINE

## 2023-08-22 PROCEDURE — 96374 THER/PROPH/DIAG INJ IV PUSH: CPT

## 2023-08-22 PROCEDURE — 99285 EMERGENCY DEPT VISIT HI MDM: CPT

## 2023-08-22 PROCEDURE — 93010 ELECTROCARDIOGRAM REPORT: CPT

## 2023-08-22 PROCEDURE — 96375 TX/PRO/DX INJ NEW DRUG ADDON: CPT

## 2023-08-22 PROCEDURE — 84484 ASSAY OF TROPONIN QUANT: CPT

## 2023-08-22 PROCEDURE — 70551 MRI BRAIN STEM W/O DYE: CPT | Performed by: EMERGENCY MEDICINE

## 2023-08-22 RX ORDER — LORAZEPAM 2 MG/ML
1 INJECTION INTRAMUSCULAR ONCE
Status: DISCONTINUED | OUTPATIENT
Start: 2023-08-22 | End: 2023-08-22

## 2023-08-22 RX ORDER — LORAZEPAM 2 MG/ML
1 INJECTION INTRAMUSCULAR ONCE
Status: COMPLETED | OUTPATIENT
Start: 2023-08-22 | End: 2023-08-22

## 2023-08-22 RX ORDER — DIAZEPAM 2 MG/1
2 TABLET ORAL 3 TIMES DAILY PRN
Qty: 20 TABLET | Refills: 0 | Status: SHIPPED | OUTPATIENT
Start: 2023-08-22 | End: 2023-08-29

## 2023-08-22 RX ORDER — MORPHINE SULFATE 2 MG/ML
INJECTION, SOLUTION INTRAMUSCULAR; INTRAVENOUS
Status: COMPLETED
Start: 2023-08-22 | End: 2023-08-22

## 2023-08-22 RX ORDER — DIAZEPAM 2 MG/1
2 TABLET ORAL 3 TIMES DAILY PRN
Qty: 20 TABLET | Refills: 0 | Status: SHIPPED | OUTPATIENT
Start: 2023-08-22 | End: 2023-08-22 | Stop reason: ALTCHOICE

## 2023-08-22 RX ORDER — MORPHINE SULFATE 2 MG/ML
2 INJECTION, SOLUTION INTRAMUSCULAR; INTRAVENOUS ONCE
Status: COMPLETED | OUTPATIENT
Start: 2023-08-22 | End: 2023-08-22

## 2023-08-22 NOTE — TELEPHONE ENCOUNTER
Please reply to pool: EM RN TRIAGE  Action Requested: Summary for Provider     []  Critical Lab, Recommendations Needed  [] Need Additional Advice  [x]   FYI    []   Need Orders  [] Need Medications Sent to Pharmacy  []  Other     SUMMARY: Patient contacts clinic reporting left sided chest pain and left arm numbness and weakness. Symptoms are coming and going over the last week. Heart feels faster than normal.  Denies shortness of breath, dizziness or lightheadedness. ER evaluation advised, patient will have her  take her. Flagged for follow up.     Reason for call: Acute  Onset: Data Unavailable                       Reason for Disposition   Chest pain or 'angina' comes and goes and is happening more often (increasing in frequency) or getting worse (increasing in severity) (Exception: chest pains that last only a few seconds)    Protocols used: Chest Pain-A-[place]

## 2023-08-22 NOTE — ED INITIAL ASSESSMENT (HPI)
Patient from home with c/o left arm weakness/tingling that began last night as well as chest pressure and headache/dizziness. Denies nausea/vomiting/diarrhea. Describes pain in the left arm as heaviness.

## 2023-08-24 RX ORDER — ALPRAZOLAM 0.25 MG/1
0.25 TABLET ORAL NIGHTLY PRN
Qty: 30 TABLET | Refills: 0 | OUTPATIENT
Start: 2023-08-24

## 2023-09-11 NOTE — TELEPHONE ENCOUNTER
MRI of prostate recommended.   Spoke to pt, she states that testing in the ER was negative for cardiac issues. To f/u with Dr. Argenis Dang for f/u visit. Scheduled for 6/8/17 with EG.

## 2023-11-26 NOTE — TELEPHONE ENCOUNTER
Please review. Protocol Failed or has No Protocol.   Please advise if ok to provide additional refills    Requested Prescriptions   Pending Prescriptions Disp Refills    SERTRALINE 50 MG Oral Tab [Pharmacy Med Name: SERTRALINE 50MG TABLETS] 30 tablet 1     Sig: TAKE 1 TABLET(50 MG) BY MOUTH DAILY       Psychiatric Non-Scheduled (Anti-Anxiety) Failed - 11/25/2023 11:37 AM        Failed - In person appointment or virtual visit in the past 6 mos or appointment in next 3 mos     Recent Outpatient Visits              1 year ago Encounter for IUD removal and reinsertion    [facility], [address] [place] [address] Klaus Helms MD    Office Visit    1 year ago Encounter for well woman exam with routine gynecological exam    [facility], [address] [place] [address] Klaus Helms MD    Office Visit    1 year ago Physical exam    Endy Benitez MD    Office Visit    2 years ago Fall, initial encounter    Carl Benitez APRN    Office Visit    2 years ago Post-COVID syndrome    Endy Benitez MD    [facility]                               Recent Outpatient Visits              1 year ago Encounter for IUD removal and reinsertion    [facility], [address] Klaus Helms MD    Office Visit    1 year ago Encounter for well woman exam with routine gynecological exam    [facility], [address] Klaus Helms MD    Office Visit    1 year ago Physical exam    Endy Benitez MD    Office Visit    2 years ago Fall, initial encounter    Carl Benitez APRN    Office Visit    2 years ago Post-COVID syndrome Lazarus Lowe, MD    Telemedicine

## 2024-04-26 DIAGNOSIS — F41.1 GAD (GENERALIZED ANXIETY DISORDER): ICD-10-CM

## 2024-04-28 NOTE — TELEPHONE ENCOUNTER
Please review; protocol failed. Or has no protocol    Requested Prescriptions   Pending Prescriptions Disp Refills    ALPRAZolam (XANAX) 0.25 MG Oral Tab 30 tablet 0     Sig: Take 1 tablet (0.25 mg total) by mouth nightly as needed for Anxiety.       Controlled Substance Medication Failed - 4/26/2024  8:43 AM        Failed - This medication is a controlled substance - forward to provider to refill              Recent Outpatient Visits              1 year ago Encounter for IUD removal and reinsertion    [facility] Dante Jovel MD    Office Visit    1 year ago Encounter for well woman exam with routine gynecological exam    [facility] Dante Jovel MD    Office Visit    1 year ago [facility]    [facility]    2 years ago Fall, initial encounter    [facility]    Office Visit    2 years ago Post-COVID syndrome    [facility]    [facility]           Future Appointments         Provider Department Appt Notes    In 1 week Jaylin Regalado MD [facility] [place] [facility]

## 2024-04-29 RX ORDER — ALPRAZOLAM 0.25 MG/1
0.25 TABLET ORAL NIGHTLY PRN
Qty: 30 TABLET | Refills: 0 | OUTPATIENT
Start: 2024-04-29

## 2024-04-29 NOTE — TELEPHONE ENCOUNTER
Please review. Rx failed/no protocol.    Patient has physical exam scheduled 05/08/2024 at 3:00 pm    Requested Prescriptions   Pending Prescriptions Disp Refills    sertraline 50 MG Oral Tab 90 tablet 0     Sig: Take 1 tablet (50 mg total) by mouth daily.       Psychiatric Non-Scheduled (Anti-Anxiety) Failed - 4/26/2024  8:43 AM        Failed - Depression Screening completed within the past 12 months        Passed - In person appointment or virtual visit in the past 6 mos or appointment in next 3 mos     Recent Outpatient Visits              1 year ago [facility] and reinsertion    [facility]    Office Visit    1 year ago Encounter for well woman exam with routine gynecological exam    [facility]    Office Visit    1 year ago Physical exam    [facility] Jaylin Regalado MD    Office Visit    2 years ago Fall, initial encounter    [facility] Carlos June APRN    Office Visit    2 years ago Post-COVID syndrome    [facility] Jaylin Regalado MD    Telemedicine          Future Appointments         Provider Department Appt Notes    In 1 week Jaylin Regalado MD [facility] px                         Future Appointments         Provider Department Appt Notes    In 1 week Jaylin Regalado MD [facility] px          Recent Outpatient Visits              1 year ago Encounter for IUD removal and reinsertion    [facility]    Office Visit    1 year ago Encounter for well woman exam with routine gynecological exam    [facility]  Dante Jovel MD    Office Visit    1 year ago Physical exam    [facility], Jaylin Jones MD    Office Visit    2 years ago Fall, initial encounter    [facility], Carlos Williamson APRN    Office Visit    2 years ago Post-COVID syndrome    [facility], Jaylin Jones MD    Telemedicine

## 2024-04-30 DIAGNOSIS — F41.1 GAD (GENERALIZED ANXIETY DISORDER): ICD-10-CM

## 2024-04-30 RX ORDER — ALPRAZOLAM 0.25 MG/1
0.25 TABLET ORAL NIGHTLY PRN
Qty: 30 TABLET | Refills: 0 | Status: CANCELLED | OUTPATIENT
Start: 2024-04-30

## 2024-05-01 NOTE — TELEPHONE ENCOUNTER
Patient needs at [facility] due to insurance.    Future Appointments   Date Time Provider Department Center   5/8/2024  3:00 PM Jaylin Regalado MD [facility]     These were both just sent to [facility] 4/29/2024, please resend.

## 2024-05-03 RX ORDER — ALPRAZOLAM 0.25 MG/1
0.25 TABLET ORAL NIGHTLY PRN
Qty: 30 TABLET | Refills: 0 | OUTPATIENT
Start: 2024-05-03

## 2024-05-08 ENCOUNTER — OFFICE VISIT (OUTPATIENT)
Dept: INTERNAL MEDICINE CLINIC | Facility: CLINIC | Age: 49
End: 2024-05-08

## 2024-05-08 VITALS
HEIGHT: 64 IN | HEART RATE: 73 BPM | SYSTOLIC BLOOD PRESSURE: 128 MMHG | BODY MASS INDEX: 26.98 KG/M2 | DIASTOLIC BLOOD PRESSURE: 80 MMHG | WEIGHT: 158 LBS

## 2024-05-08 DIAGNOSIS — Z12.31 VISIT FOR SCREENING MAMMOGRAM: ICD-10-CM

## 2024-05-08 DIAGNOSIS — Z71.84 TRAVEL ADVICE ENCOUNTER: ICD-10-CM

## 2024-05-08 DIAGNOSIS — F41.1 GAD (GENERALIZED ANXIETY DISORDER): ICD-10-CM

## 2024-05-08 DIAGNOSIS — Z00.00 PHYSICAL EXAM: Primary | ICD-10-CM

## 2024-05-08 PROCEDURE — 3079F DIAST BP 80-89 MM HG: CPT | Performed by: INTERNAL MEDICINE

## 2024-05-08 PROCEDURE — 99396 PREV VISIT EST AGE 40-64: CPT | Performed by: INTERNAL MEDICINE

## 2024-05-08 PROCEDURE — 3074F SYST BP LT 130 MM HG: CPT | Performed by: INTERNAL MEDICINE

## 2024-05-08 PROCEDURE — 3008F BODY MASS INDEX DOCD: CPT | Performed by: INTERNAL MEDICINE

## 2024-05-08 RX ORDER — ALPRAZOLAM 0.25 MG/1
0.25 TABLET ORAL 2 TIMES DAILY PRN
Qty: 60 TABLET | Refills: 1 | Status: SHIPPED | OUTPATIENT
Start: 2024-05-08

## 2024-05-08 RX ORDER — ALPRAZOLAM 0.25 MG/1
0.25 TABLET ORAL NIGHTLY PRN
COMMUNITY
End: 2024-05-08

## 2024-05-20 NOTE — PROGRESS NOTES
HPI:    Patient ID: Steph Maza is a 49 year old female.    HPI    Physical exam  Generally healthy    /80   Pulse 73   Ht 5' 4\" (1.626 m)   Wt 158 lb (71.7 kg)   LMP 04/17/2024 (Approximate)   BMI 27.12 kg/m²   Wt Readings from Last 6 Encounters:   05/08/24 158 lb (71.7 kg)   08/22/23 150 lb (68 kg)   08/02/22 148 lb 12.8 oz (67.5 kg)   06/01/22 148 lb 6.4 oz (67.3 kg)   05/18/22 149 lb (67.6 kg)   10/05/21 151 lb (68.5 kg)     Body mass index is 27.12 kg/m².  HGBA1C:    Lab Results   Component Value Date    A1C 4.8 06/04/2022    A1C 4.9 04/24/2021    A1C 4.7 02/01/2020    EAG 91 06/04/2022         Review of Systems   Constitutional:  Negative for activity change, chills, fatigue and fever.   HENT:  Negative for ear discharge, nosebleeds, postnasal drip, rhinorrhea, sinus pressure and sore throat.    Eyes:  Negative for pain, discharge and redness.   Respiratory:  Negative for cough, chest tightness, shortness of breath and wheezing.    Cardiovascular:  Negative for chest pain, palpitations and leg swelling.   Gastrointestinal:  Negative for abdominal pain, blood in stool, constipation, diarrhea, nausea and vomiting.   Genitourinary:  Negative for difficulty urinating, dysuria, frequency, hematuria and urgency.   Musculoskeletal:  Negative for back pain, gait problem and joint swelling.   Skin:  Negative for rash.   Neurological:  Negative for syncope, weakness, light-headedness and headaches.   Psychiatric/Behavioral:  Negative for dysphoric mood. The patient is not nervous/anxious.          Current Outpatient Medications   Medication Sig Dispense Refill    ALPRAZolam 0.25 MG Oral Tab Take 1 tablet (0.25 mg total) by mouth 2 (two) times daily as needed. 60 tablet 1    sertraline 50 MG Oral Tab Take 1 tablet (50 mg total) by mouth daily. 90 tablet 1    Omeprazole 40 MG Oral Capsule Delayed Release Take 1 capsule (40 mg total) by mouth daily. 90 capsule 3    Alum & Mag Hydroxide-Simeth 200-200-20  MG/5ML Oral Suspension Take 10 mL by mouth 4 (four) times daily as needed. 355 mL 0     Allergies:No Known Allergies    HISTORY:  Past Medical History:    Advanced maternal age (AMA) in pregnancy    AMA (advanced maternal age) multigravida 35+ (HCC)    High blood pressure    borderline     History of pregnancy     x3; 5 hr labor , 12 hr labor- , 5 hr labor- ,      Visual impairment    REader      Past Surgical History:   Procedure Laterality Date    D & c              Family History   Problem Relation Age of Onset    Diabetes Mother     High Blood Pressure Mother     Depression Mother     Cancer Maternal Grandmother         pancreatic    Cancer Paternal Uncle         pancreatic      Social History:   Social History     Socioeconomic History    Marital status:    Tobacco Use    Smoking status: Never     Passive exposure: Never    Smokeless tobacco: Never   Vaping Use    Vaping status: Never Used   Substance and Sexual Activity    Alcohol use: Yes     Comment: beer & liquor socially    Drug use: No   Other Topics Concern    Caffeine Concern Yes     Comment: soda, coffee 1 cup        PHYSICAL EXAM:    Physical Exam  Constitutional:       General: She is not in acute distress.     Appearance: She is well-developed. She is not diaphoretic.   HENT:      Head: Normocephalic and atraumatic.      Right Ear: Ear canal normal.      Left Ear: Ear canal normal.      Nose: Nose normal.      Mouth/Throat:      Pharynx: No oropharyngeal exudate or posterior oropharyngeal erythema.   Eyes:      General: No scleral icterus.        Right eye: No discharge.         Left eye: No discharge.      Conjunctiva/sclera: Conjunctivae normal.      Pupils: Pupils are equal, round, and reactive to light.   Cardiovascular:      Rate and Rhythm: Normal rate and regular rhythm.      Heart sounds: Normal heart sounds. No murmur heard.  Pulmonary:      Effort: Pulmonary effort is normal. No respiratory  distress.      Breath sounds: Normal breath sounds. No wheezing.   Abdominal:      General: Bowel sounds are normal.      Palpations: Abdomen is soft. There is no mass.      Tenderness: There is no abdominal tenderness. There is no guarding or rebound.      Hernia: No hernia is present.   Musculoskeletal:         General: No tenderness.   Skin:     General: Skin is warm and dry.      Findings: No lesion or rash.   Neurological:      Mental Status: She is alert.      Gait: Gait normal.   Psychiatric:         Behavior: Behavior normal.         Thought Content: Thought content normal.              ASSESSMENT/PLAN:   (Z00.00) Physical exam  (primary encounter diagnosis)  Plan: [facility] - INTERNAL, CBC With Differential With         Platelet, Comp Metabolic Panel (14), Lipid         Panel, Hemoglobin A1C, TSH and Free T4,         Urinalysis, Routine  Generally helathy    (F41.1) WENDY (generalized anxiety disorder)  Plan: ALPRAZolam 0.25 MG Oral Tab, sertraline 50 MG         Oral Tab        Anxiety controlled with meds  continue    (Z12.31) Visit for screening mammogram  Plan: [facility] MIKE 2D+3D SCREENING BILAT         (CPT=77067/50530)        .Breast exam.  Bilateral  No discrete palpable masses or tenderness    No nipple discharge  And no axillary adenopathy.  Self breast exam advised      (Z71.84) Travel advice encounter  Plan: Travel Medicine        Referral given    Patient voiced understanding  and agrees with plan         Orders Placed This Encounter   Procedures    CBC With Differential With Platelet    Comp Metabolic Panel (14)    Lipid Panel    Hemoglobin A1C    TSH and Free T4    Urinalysis, Routine       Meds This Visit:  Requested Prescriptions     Signed Prescriptions Disp Refills    ALPRAZolam 0.25 MG Oral Tab 60 tablet 1     Sig: Take 1 tablet (0.25 mg total) by mouth 2 (two) times daily as needed.    sertraline 50 MG Oral Tab 90 tablet 1     Sig: Take 1 tablet (50 mg total) by mouth daily.       Imaging &  Referrals:  [facility] - INTERNAL  [facility] - INTERNAL  [facility] MIKE 2D+3D SCREENING BILAT (CPT=77067/04030)        ID#[id number]

## 2024-08-01 ENCOUNTER — LAB ENCOUNTER (OUTPATIENT)
Dept: LAB | Age: 49
End: 2024-08-01
Attending: INTERNAL MEDICINE
Payer: COMMERCIAL

## 2024-08-01 LAB
ALBUMIN SERPL-MCNC: 4.4 G/DL (ref 3.2–4.8)
ALBUMIN/GLOB SERPL: 1.6 {RATIO} (ref 1–2)
ALP LIVER SERPL-CCNC: 71 U/L
ALT SERPL-CCNC: 27 U/L
ANION GAP SERPL CALC-SCNC: 6 MMOL/L (ref 0–18)
AST SERPL-CCNC: 19 U/L (ref ?–34)
BASOPHILS # BLD AUTO: 0.05 X10(3) UL (ref 0–0.2)
BASOPHILS NFR BLD AUTO: 0.5 %
BILIRUB SERPL-MCNC: 0.6 MG/DL (ref 0.3–1.2)
BILIRUB UR QL: NEGATIVE
BUN BLD-MCNC: 7 MG/DL (ref 9–23)
BUN/CREAT SERPL: 9 (ref 10–20)
CALCIUM BLD-MCNC: 9.2 MG/DL (ref 8.7–10.4)
CHLORIDE SERPL-SCNC: 109 MMOL/L (ref 98–112)
CHOLEST SERPL-MCNC: 244 MG/DL (ref ?–200)
CLARITY UR: CLEAR
CO2 SERPL-SCNC: 27 MMOL/L (ref 21–32)
CREAT BLD-MCNC: 0.78 MG/DL
DEPRECATED RDW RBC AUTO: 43.9 FL (ref 35.1–46.3)
EGFRCR SERPLBLD CKD-EPI 2021: 93 ML/MIN/1.73M2 (ref 60–?)
EOSINOPHIL # BLD AUTO: 0.64 X10(3) UL (ref 0–0.7)
EOSINOPHIL NFR BLD AUTO: 6.9 %
ERYTHROCYTE [DISTWIDTH] IN BLOOD BY AUTOMATED COUNT: 12.5 % (ref 11–15)
EST. AVERAGE GLUCOSE BLD GHB EST-MCNC: 94 MG/DL (ref 68–126)
FASTING PATIENT LIPID ANSWER: YES
FASTING STATUS PATIENT QL REPORTED: YES
GLOBULIN PLAS-MCNC: 2.7 G/DL (ref 2–3.5)
GLUCOSE BLD-MCNC: 93 MG/DL (ref 70–99)
GLUCOSE UR-MCNC: NORMAL MG/DL
HBA1C MFR BLD: 4.9 % (ref ?–5.7)
HCT VFR BLD AUTO: 40.2 %
HDLC SERPL-MCNC: 61 MG/DL (ref 40–59)
HGB BLD-MCNC: 13.7 G/DL
IMM GRANULOCYTES # BLD AUTO: 0.02 X10(3) UL (ref 0–1)
IMM GRANULOCYTES NFR BLD: 0.2 %
KETONES UR-MCNC: NEGATIVE MG/DL
LDLC SERPL CALC-MCNC: 165 MG/DL (ref ?–100)
LEUKOCYTE ESTERASE UR QL STRIP.AUTO: NEGATIVE
LYMPHOCYTES # BLD AUTO: 3.34 X10(3) UL (ref 1–4)
LYMPHOCYTES NFR BLD AUTO: 35.9 %
MCH RBC QN AUTO: 32.9 PG (ref 26–34)
MCHC RBC AUTO-ENTMCNC: 34.1 G/DL (ref 31–37)
MCV RBC AUTO: 96.4 FL
MONOCYTES # BLD AUTO: 0.63 X10(3) UL (ref 0.1–1)
MONOCYTES NFR BLD AUTO: 6.8 %
NEUTROPHILS # BLD AUTO: 4.62 X10 (3) UL (ref 1.5–7.7)
NEUTROPHILS # BLD AUTO: 4.62 X10(3) UL (ref 1.5–7.7)
NEUTROPHILS NFR BLD AUTO: 49.7 %
NITRITE UR QL STRIP.AUTO: NEGATIVE
NONHDLC SERPL-MCNC: 183 MG/DL (ref ?–130)
OSMOLALITY SERPL CALC.SUM OF ELEC: 292 MOSM/KG (ref 275–295)
PH UR: 5.5 [PH] (ref 5–8)
PLATELET # BLD AUTO: 309 10(3)UL (ref 150–450)
POTASSIUM SERPL-SCNC: 4 MMOL/L (ref 3.5–5.1)
PROT SERPL-MCNC: 7.1 G/DL (ref 5.7–8.2)
PROT UR-MCNC: NEGATIVE MG/DL
RBC # BLD AUTO: 4.17 X10(6)UL
SODIUM SERPL-SCNC: 142 MMOL/L (ref 136–145)
SP GR UR STRIP: 1.02 (ref 1–1.03)
T4 FREE SERPL-MCNC: 1.2 NG/DL (ref 0.8–1.7)
TRIGL SERPL-MCNC: 103 MG/DL (ref 30–149)
TSI SER-ACNC: 1.54 MIU/ML (ref 0.55–4.78)
UROBILINOGEN UR STRIP-ACNC: NORMAL
VLDLC SERPL CALC-MCNC: 20 MG/DL (ref 0–30)
WBC # BLD AUTO: 9.3 X10(3) UL (ref 4–11)

## 2024-08-01 PROCEDURE — 84439 ASSAY OF FREE THYROXINE: CPT | Performed by: INTERNAL MEDICINE

## 2024-08-01 PROCEDURE — 85025 COMPLETE CBC W/AUTO DIFF WBC: CPT | Performed by: INTERNAL MEDICINE

## 2024-08-01 PROCEDURE — 80053 COMPREHEN METABOLIC PANEL: CPT | Performed by: INTERNAL MEDICINE

## 2024-08-01 PROCEDURE — 36415 COLL VENOUS BLD VENIPUNCTURE: CPT | Performed by: INTERNAL MEDICINE

## 2024-08-01 PROCEDURE — 83036 HEMOGLOBIN GLYCOSYLATED A1C: CPT | Performed by: INTERNAL MEDICINE

## 2024-08-01 PROCEDURE — 84443 ASSAY THYROID STIM HORMONE: CPT | Performed by: INTERNAL MEDICINE

## 2024-08-01 PROCEDURE — 81001 URINALYSIS AUTO W/SCOPE: CPT | Performed by: INTERNAL MEDICINE

## 2024-08-01 PROCEDURE — 80061 LIPID PANEL: CPT | Performed by: INTERNAL MEDICINE

## 2024-08-06 ENCOUNTER — OFFICE VISIT (OUTPATIENT)
Dept: OBGYN CLINIC | Facility: CLINIC | Age: 49
End: 2024-08-06
Payer: COMMERCIAL

## 2024-08-06 VITALS
WEIGHT: 156 LBS | HEIGHT: 64 IN | DIASTOLIC BLOOD PRESSURE: 95 MMHG | HEART RATE: 98 BPM | BODY MASS INDEX: 26.63 KG/M2 | SYSTOLIC BLOOD PRESSURE: 147 MMHG

## 2024-08-06 DIAGNOSIS — E66.3 OVERWEIGHT WITH BODY MASS INDEX (BMI) OF 26 TO 26.9 IN ADULT: ICD-10-CM

## 2024-08-06 DIAGNOSIS — R03.0 ELEVATED BP WITHOUT DIAGNOSIS OF HYPERTENSION: ICD-10-CM

## 2024-08-06 DIAGNOSIS — Z01.419 ENCOUNTER FOR WELL WOMAN EXAM WITH ROUTINE GYNECOLOGICAL EXAM: Primary | ICD-10-CM

## 2024-08-06 DIAGNOSIS — N39.3 STRESS INCONTINENCE, FEMALE: ICD-10-CM

## 2024-08-06 PROCEDURE — 3080F DIAST BP >= 90 MM HG: CPT | Performed by: STUDENT IN AN ORGANIZED HEALTH CARE EDUCATION/TRAINING PROGRAM

## 2024-08-06 PROCEDURE — 3008F BODY MASS INDEX DOCD: CPT | Performed by: STUDENT IN AN ORGANIZED HEALTH CARE EDUCATION/TRAINING PROGRAM

## 2024-08-06 PROCEDURE — 99214 OFFICE O/P EST MOD 30 MIN: CPT | Performed by: STUDENT IN AN ORGANIZED HEALTH CARE EDUCATION/TRAINING PROGRAM

## 2024-08-06 PROCEDURE — 99396 PREV VISIT EST AGE 40-64: CPT | Performed by: STUDENT IN AN ORGANIZED HEALTH CARE EDUCATION/TRAINING PROGRAM

## 2024-08-06 PROCEDURE — 3077F SYST BP >= 140 MM HG: CPT | Performed by: STUDENT IN AN ORGANIZED HEALTH CARE EDUCATION/TRAINING PROGRAM

## 2024-08-06 NOTE — PROGRESS NOTES
Steph Maza is a 49 year old female  Patient's last menstrual period was 2024 (approximate).   Chief Complaint   Patient presents with    Annual     NP, Annual Exam    And c/o urinary incontinence and noted blood in UA per PCP    Regular cycles, 28-30 days; last about 5 days  Some mild cramping    Will do kegel exercise from time to time  Also c/o overactive bladder    States when she goes to doctor, BP is usually high.  Used to check BP at home but no longer doing recently  Hx of preeclampsia with her last 3 deliveries    Has mammogram scheduled next month; as well as colonoscopy    PSH: kenyon hahn    Working- claims  Lives with  and 2 kids; feels safe at home      OBSTETRICS HISTORY:     OB History    Para Term  AB Living   6 4     2 4   SAB IAB Ectopic Multiple Live Births   2              # Outcome Date GA Lbr Dilan/2nd Weight Sex Type Anes PTL Lv   6 SAB            5 SAB            4 Para            3 Para            2 Para            1 Para                GYNE HISTORY:     Hx Prior Abnormal Pap: No  Pap Date: 22  Pap Result Notes: negative   Menarche: 16 (2024  3:26 PM)  Period Cycle (Days): 28 (2024  3:26 PM)  Period Duration (Days): 3-5 (2024  3:26 PM)  Period Flow: moderate (2024  3:26 PM)  Use of Birth Control (if yes, specify type): Paraguard IUD (2024  3:26 PM)  Hx Prior Abnormal Pap: No (2024  3:26 PM)  Pap Date: 22 (2024  3:26 PM)  Pap Result Notes: negative (2024  3:26 PM)        Latest Ref Rng & Units 2022     5:27 PM 3/19/2019    10:28 AM   RECENT PAP RESULTS   Thinprep Pap Negative for intraepithelial lesion or malignancy Negative for intraepithelial lesion or malignancy  Negative for intraepithelial lesion or malignancy    HPV Negative Negative  Negative          MEDICAL HISTORY:     Past Medical History:    Advanced maternal age (AMA) in pregnancy    AMA (advanced maternal age) multigravida 35+ (HCC)    High blood  pressure    borderline     History of pregnancy     x3; 5 hr labor , 12 hr labor- , 5 hr labor- ,      Visual impairment    REader       SURGICAL HISTORY:     Past Surgical History:   Procedure Laterality Date    D & c               SOCIAL HISTORY:     Social History     Socioeconomic History    Marital status:    Tobacco Use    Smoking status: Never     Passive exposure: Never    Smokeless tobacco: Never   Vaping Use    Vaping status: Never Used   Substance and Sexual Activity    Alcohol use: Yes     Comment: beer & liquor socially    Drug use: No   Other Topics Concern    Caffeine Concern Yes     Comment: soda, coffee 1 cup        FAMILY HISTORY:     Family History   Problem Relation Age of Onset    Diabetes Mother     High Blood Pressure Mother     Depression Mother     Cancer Maternal Grandmother         pancreatic    Cancer Paternal Uncle         pancreatic       MEDICATIONS:       Current Outpatient Medications:     ALPRAZolam 0.25 MG Oral Tab, Take 1 tablet (0.25 mg total) by mouth 2 (two) times daily as needed., Disp: 60 tablet, Rfl: 1    sertraline 50 MG Oral Tab, Take 1 tablet (50 mg total) by mouth daily., Disp: 90 tablet, Rfl: 1    Omeprazole 40 MG Oral Capsule Delayed Release, Take 1 capsule (40 mg total) by mouth daily., Disp: 90 capsule, Rfl: 3    Alum & Mag Hydroxide-Simeth 200-200-20 MG/5ML Oral Suspension, Take 10 mL by mouth 4 (four) times daily as needed. (Patient not taking: Reported on 2024), Disp: 355 mL, Rfl: 0    ALLERGIES:     No Known Allergies      REVIEW OF SYSTEMS:     Constitutional:    denies fever / chills  Eyes:     denies blurred or double vision  Cardiovascular:  denies chest pain or palpitations  Respiratory:    denies shortness of breath  Gastrointestinal:  denies severe abdominal pain, frequent diarrhea or constipation, nausea / vomiting  Genitourinary:    denies dysuria, bothersome incontinence  Skin/Breast:   denies any breast  pain, lumps, or discharge  Neurological:    denies frequent severe headaches  Psychiatric:   denies depression or anxiety, thoughts of harming self or others  Heme/Lymph:    denies easy bruising or bleeding      PHYSICAL EXAM:   Blood pressure (!) 147/95, pulse 98, height 5' 4\" (1.626 m), weight 156 lb (70.8 kg), last menstrual period 07/25/2024, not currently breastfeeding.  Constitutional:  well developed, well nourished  Head/Face:  normocephalic  Neck/Thyroid: thyroid symmetric, no thyromegaly, no nodules, no adenopathy  Lymphatic: no abnormal supraclavicular or axillary adenopathy is noted  Respiratory:      chest wall symmetric and nontender on palpation, clear to asculation bilateral, no wheezing, rales, ronchi, and resonance normal upon percussion  Cardiovascular: chest normal in appearance, regular rate and rhythm, no murmurs, PMI palpated midclavicular line  Breast:   normal without palpable masses, tenderness, asymmetry, nipple discharge, nipple retraction or skin changes  Abdomen:   soft, nontender, nondistended, no masses  Skin/Hair:  no unusual rashes or bruises  Extremities:  no edema, no cyanosis, non tender bilaterally  Psychiatric:   oriented to time, place, person and situation. Appropriate mood and affect    Pelvic Exam:  GYNE/: External Genitalia: Normal appearing, no lesions. Urethral meatus appear wnl, no abnormal discharge or lesions noted.          Bladder: well supported, urethra wnl +Urethral hypermobility noted with valsalva, no lesions or fissures                     Vagina: normal pink mucosa, no lesions, normal clear discharge.                      Uterus: anteverted, mobile, non tender, normal size                     Cervix: Normal, IUD strings seen                     Adnexa: non tender, no masses, normal size  Anus: no hemorroids         ASSESSMENT & PLAN:     Steph was seen today for annual.    Diagnoses and all orders for this visit:    Encounter for well woman exam with  routine gynecological exam    Stress incontinence, female  -     Urogynecology Referral - In Network    Elevated BP without diagnosis of hypertension    Overweight with body mass index (BMI) of 26 to 26.9 in adult    Normal exam.  Pap smear UTD, due next year.   Recommend repeat pap smear with co-testing every 3 years for normal/ -HPV.  Recommend annual screening mammograms.   Recommend screening colonoscopy starting at 45  Recommend DEXA scan for osteoporosis as indicated.  Discussed importance of incorporating frequent weight bearing exercises and supplemental Vitamin D  Maintain healthy lifestyle with well-balance diet and daily exercise.  Return to clinic in one year or as needed.    #elevated BP: per chart review patient has had 2 elevated Bps on separate occasions. Meets criteria for HTN. Encouraged pt to see PCP for management.      FOLLOW-UP     No follow-ups on file.      Penelope Alexander MD  8/6/2024

## 2024-08-11 ENCOUNTER — APPOINTMENT (OUTPATIENT)
Dept: CT IMAGING | Facility: HOSPITAL | Age: 49
End: 2024-08-11
Attending: NURSE PRACTITIONER
Payer: COMMERCIAL

## 2024-08-11 ENCOUNTER — HOSPITAL ENCOUNTER (EMERGENCY)
Facility: HOSPITAL | Age: 49
Discharge: HOME | End: 2024-08-11
Payer: COMMERCIAL

## 2024-08-11 VITALS
RESPIRATION RATE: 12 BRPM | HEART RATE: 68 BPM | SYSTOLIC BLOOD PRESSURE: 141 MMHG | WEIGHT: 153 LBS | DIASTOLIC BLOOD PRESSURE: 85 MMHG | BODY MASS INDEX: 26 KG/M2 | OXYGEN SATURATION: 99 % | TEMPERATURE: 98 F

## 2024-08-11 DIAGNOSIS — I16.0 HYPERTENSIVE URGENCY: Primary | ICD-10-CM

## 2024-08-11 LAB
ALBUMIN SERPL-MCNC: 4.8 G/DL (ref 3.2–4.8)
ALBUMIN/GLOB SERPL: 1.8 {RATIO} (ref 1–2)
ALP LIVER SERPL-CCNC: 69 U/L
ALT SERPL-CCNC: 20 U/L
ANION GAP SERPL CALC-SCNC: 8 MMOL/L (ref 0–18)
AST SERPL-CCNC: 20 U/L (ref ?–34)
BASOPHILS # BLD AUTO: 0.06 X10(3) UL (ref 0–0.2)
BASOPHILS NFR BLD AUTO: 0.5 %
BILIRUB SERPL-MCNC: 0.8 MG/DL (ref 0.3–1.2)
BUN BLD-MCNC: 8 MG/DL (ref 9–23)
BUN/CREAT SERPL: 10.8 (ref 10–20)
CALCIUM BLD-MCNC: 9.7 MG/DL (ref 8.7–10.4)
CHLORIDE SERPL-SCNC: 107 MMOL/L (ref 98–112)
CO2 SERPL-SCNC: 25 MMOL/L (ref 21–32)
CREAT BLD-MCNC: 0.74 MG/DL
DEPRECATED RDW RBC AUTO: 42.5 FL (ref 35.1–46.3)
EGFRCR SERPLBLD CKD-EPI 2021: 99 ML/MIN/1.73M2 (ref 60–?)
EOSINOPHIL # BLD AUTO: 0.5 X10(3) UL (ref 0–0.7)
EOSINOPHIL NFR BLD AUTO: 4.4 %
ERYTHROCYTE [DISTWIDTH] IN BLOOD BY AUTOMATED COUNT: 12.4 % (ref 11–15)
GLOBULIN PLAS-MCNC: 2.6 G/DL (ref 2–3.5)
GLUCOSE BLD-MCNC: 87 MG/DL (ref 70–99)
HCT VFR BLD AUTO: 40.4 %
HGB BLD-MCNC: 14.2 G/DL
IMM GRANULOCYTES # BLD AUTO: 0.02 X10(3) UL (ref 0–1)
IMM GRANULOCYTES NFR BLD: 0.2 %
LYMPHOCYTES # BLD AUTO: 3.45 X10(3) UL (ref 1–4)
LYMPHOCYTES NFR BLD AUTO: 30.1 %
MCH RBC QN AUTO: 32.7 PG (ref 26–34)
MCHC RBC AUTO-ENTMCNC: 35.1 G/DL (ref 31–37)
MCV RBC AUTO: 93.1 FL
MONOCYTES # BLD AUTO: 0.75 X10(3) UL (ref 0.1–1)
MONOCYTES NFR BLD AUTO: 6.5 %
NEUTROPHILS # BLD AUTO: 6.69 X10 (3) UL (ref 1.5–7.7)
NEUTROPHILS # BLD AUTO: 6.69 X10(3) UL (ref 1.5–7.7)
NEUTROPHILS NFR BLD AUTO: 58.3 %
OSMOLALITY SERPL CALC.SUM OF ELEC: 288 MOSM/KG (ref 275–295)
PLATELET # BLD AUTO: 369 10(3)UL (ref 150–450)
POTASSIUM SERPL-SCNC: 3.4 MMOL/L (ref 3.5–5.1)
PROT SERPL-MCNC: 7.4 G/DL (ref 5.7–8.2)
RBC # BLD AUTO: 4.34 X10(6)UL
SODIUM SERPL-SCNC: 140 MMOL/L (ref 136–145)
TROPONIN I SERPL HS-MCNC: <3 NG/L
WBC # BLD AUTO: 11.5 X10(3) UL (ref 4–11)

## 2024-08-11 PROCEDURE — 93010 ELECTROCARDIOGRAM REPORT: CPT

## 2024-08-11 PROCEDURE — 96361 HYDRATE IV INFUSION ADD-ON: CPT

## 2024-08-11 PROCEDURE — 96375 TX/PRO/DX INJ NEW DRUG ADDON: CPT

## 2024-08-11 PROCEDURE — 99284 EMERGENCY DEPT VISIT MOD MDM: CPT

## 2024-08-11 PROCEDURE — 70450 CT HEAD/BRAIN W/O DYE: CPT | Performed by: NURSE PRACTITIONER

## 2024-08-11 PROCEDURE — 96374 THER/PROPH/DIAG INJ IV PUSH: CPT

## 2024-08-11 PROCEDURE — 80053 COMPREHEN METABOLIC PANEL: CPT

## 2024-08-11 PROCEDURE — 84484 ASSAY OF TROPONIN QUANT: CPT | Performed by: NURSE PRACTITIONER

## 2024-08-11 PROCEDURE — 93005 ELECTROCARDIOGRAM TRACING: CPT

## 2024-08-11 PROCEDURE — 85025 COMPLETE CBC W/AUTO DIFF WBC: CPT

## 2024-08-11 RX ORDER — DIPHENHYDRAMINE HYDROCHLORIDE 50 MG/ML
25 INJECTION INTRAMUSCULAR; INTRAVENOUS ONCE
Status: COMPLETED | OUTPATIENT
Start: 2024-08-11 | End: 2024-08-11

## 2024-08-11 RX ORDER — METOCLOPRAMIDE HYDROCHLORIDE 5 MG/ML
10 INJECTION INTRAMUSCULAR; INTRAVENOUS ONCE
Status: COMPLETED | OUTPATIENT
Start: 2024-08-11 | End: 2024-08-11

## 2024-08-11 RX ORDER — KETOROLAC TROMETHAMINE 15 MG/ML
15 INJECTION, SOLUTION INTRAMUSCULAR; INTRAVENOUS ONCE
Status: COMPLETED | OUTPATIENT
Start: 2024-08-11 | End: 2024-08-11

## 2024-08-11 RX ORDER — AMLODIPINE BESYLATE 5 MG/1
5 TABLET ORAL ONCE
Status: COMPLETED | OUTPATIENT
Start: 2024-08-11 | End: 2024-08-11

## 2024-08-11 RX ORDER — AMLODIPINE BESYLATE 5 MG/1
5 TABLET ORAL DAILY
Qty: 30 TABLET | Refills: 0 | Status: SHIPPED | OUTPATIENT
Start: 2024-08-11 | End: 2024-09-10

## 2024-08-11 NOTE — ED PROVIDER NOTES
Patient Seen in: [facility] Emergency Department      History     Chief Complaint   Patient presents with    Headache    Hypertension     Stated Complaint: HTN (160/95), Headache, Nausea    Subjective:   48yo/f w no chronic medical problems reports to the ED w hypertension/headache. Patient reports borderline/elevated BP readings prior. She went to her OB for her yearly visit on Tuesday and it was elevated. Now with headache x 3 days. No vision changes. No vomiting. No weakness. No dizziness. No hx fo anticoagulation.             Objective:   Past Medical History:    Advanced maternal age (AMA) in pregnancy    AMA (advanced maternal age) multigravida 35+ (HCC)    High blood pressure    borderline     History of pregnancy     x3; 5 hr labor , 12 hr labor- , 5 hr labor- ,      Visual impairment    REader              Past Surgical History:   Procedure Laterality Date    D & c                        Social History     Socioeconomic History    Marital status:    Tobacco Use    Smoking status: Never     Passive exposure: Never    Smokeless tobacco: Never   Vaping Use    Vaping status: Never Used   Substance and Sexual Activity    Alcohol use: Yes     Comment: beer & liquor socially    Drug use: No   Other Topics Concern    Caffeine Concern Yes     Comment: soda, coffee 1 cup              Review of Systems   All other systems reviewed and are negative.      Positive for stated Chief Complaint: Headache and Hypertension    Other systems are as noted in HPI.  Constitutional and vital signs reviewed.      All other systems reviewed and negative except as noted above.    Physical Exam     ED Triage Vitals [24 1701]   BP (!) 161/106   Pulse 91   Resp 18   Temp 98.1 °F (36.7 °C)   Temp src Temporal   SpO2 97 %   O2 Device None (Room air)       Current Vitals:   Vital Signs  BP: 144/82  Pulse: 69  Resp: 15  Temp: 98.1 °F (36.7 °C)  Temp src: Temporal  MAP (mmHg):  100    Oxygen Therapy  SpO2: 100 %  O2 Device: None (Room air)            Physical Exam  Vitals and nursing note reviewed.   Constitutional:       General: She is not in acute distress.     Appearance: She is well-developed.   HENT:      Head: Normocephalic and atraumatic.      Nose: Nose normal.      Mouth/Throat:      Mouth: Mucous membranes are moist.   Eyes:      Conjunctiva/sclera: Conjunctivae normal.      Pupils: Pupils are equal, round, and reactive to light.   Cardiovascular:      Rate and Rhythm: Normal rate and regular rhythm.      Heart sounds: Normal heart sounds.   Pulmonary:      Effort: Pulmonary effort is normal.      Breath sounds: Normal breath sounds.   Abdominal:      General: Bowel sounds are normal.      Palpations: Abdomen is soft.   Musculoskeletal:         General: No tenderness or deformity. Normal range of motion.      Cervical back: Normal range of motion and neck supple.   Skin:     General: Skin is warm and dry.      Capillary Refill: Capillary refill takes less than 2 seconds.      Findings: No rash.      Comments: Normal color   Neurological:      General: No focal deficit present.      Mental Status: She is alert and oriented to person, place, and time.      GCS: GCS eye subscore is 4. GCS verbal subscore is 5. GCS motor subscore is 6.      Cranial Nerves: No cranial nerve deficit.      Gait: Gait normal.             ED Course     Labs Reviewed   CBC WITH DIFFERENTIAL WITH PLATELET - Abnormal; Notable for the following components:       Result Value    WBC 11.5 (*)     All other components within normal limits   COMP METABOLIC PANEL (14) - Abnormal; Notable for the following components:    Potassium 3.4 (*)     BUN 8 (*)     All other components within normal limits   TROPONIN I HIGH SENSITIVITY - Normal     EKG    Rate, intervals and axes as noted on EKG Report.  Rate: 80   Rhythm: Sinus Rhythm  Reading: NSR no [place] no ectopy normal axis  Stable clinical condition  No  comparison                 FINDINGS:  CSF SPACES: The ventricles, cisterns, and sulci are commensurate in caliber and appropriate for age. No hydrocephalus, subarachnoid hemorrhage, or effacement of the basal cisterns is appreciated. There is no extra-axial fluid collection.  CEREBRUM: No acute intraparenchymal hemorrhage, edema, or cortical sulcal effacement is apparent. There is no space-occupying lesion, mass effect, or shift of midline structures. The gray-white matter junction is preserved and bilaterally symmetric in  appearance.  CEREBELLUM: No edema, hemorrhage, mass, acute infarction, or significant atrophy.    BRAINSTEM: No edema, hemorrhage, mass, acute infarction, or significant atrophy.    CALVARIUM: There is no apparent depressed fracture, mass, or other significant visible lesion.    SINUSES: Limited views demonstrate scattered mucosal thickening of the ethmoid air cells.  There is mild sclerosis and wall thickening of the right maxillary sinus, which may reflect chronic mucoperiosteal reaction.    ORBITS: Limited views are grossly unremarkable.       OTHER: Dental braces are suggested on the  view.                   Impression  CONCLUSION:  No acute intracranial process by noncontrast CT technique.           Dictated by (CST): Fernando Cordova MD on 8/11/2024 at 6:52 PM      Finalized by (CST): Fernando Cordova MD on 8/11/2024 at 6:53 PM               MDM             Medical Decision Making  48yo/f w hx and exam as stated; headache/htn    Non toxic, well appearing  Ct non acute  No focal deficits  Improved w meds  No chest pain  Trop neg  EKG non acute  Stable    Plan  Dc to home  [facility]  Close fu with Dr. Regalado      Amount and/or Complexity of Data Reviewed  Labs:  Decision-making details documented in ED Course.  Radiology:  Decision-making details documented in ED Course.    Risk  OTC drugs.  Prescription drug management.        Disposition and Plan     Clinical Impression:  1. Hypertensive  urgency         Disposition:  Discharge  8/11/2024  8:00 pm    Follow-up:  Jaylin Regalado MD  [address] [place] [id number] [phone]    Follow up in 2 day(s)            Medications Prescribed:  Current Discharge Medication List        START taking these medications    Details   amLODIPine 5 MG Oral Tab Take 1 tablet (5 mg total) by mouth daily.  Qty: 30 tablet, Refills: 0

## 2024-08-11 NOTE — ED INITIAL ASSESSMENT (HPI)
Patient saw  on Tuesday and was told her blood pressure was elevated.   Headache since yesterday.   No current blood pressure medication.  Denies chest pain or shortness of breath.

## 2024-08-12 LAB
ATRIAL RATE: 80 BPM
P AXIS: 51 DEGREES
P-R INTERVAL: 140 MS
Q-T INTERVAL: 382 MS
QRS DURATION: 82 MS
QTC CALCULATION (BEZET): 440 MS
R AXIS: 15 DEGREES
T AXIS: 48 DEGREES
VENTRICULAR RATE: 80 BPM

## 2024-08-13 ENCOUNTER — HOSPITAL ENCOUNTER (EMERGENCY)
Facility: HOSPITAL | Age: 49
Discharge: HOME | End: 2024-08-13
Attending: EMERGENCY MEDICINE
Payer: COMMERCIAL

## 2024-08-13 ENCOUNTER — TELEPHONE (OUTPATIENT)
Dept: INTERNAL MEDICINE CLINIC | Facility: CLINIC | Age: 49
End: 2024-08-13

## 2024-08-13 ENCOUNTER — APPOINTMENT (OUTPATIENT)
Dept: GENERAL RADIOLOGY | Facility: HOSPITAL | Age: 49
End: 2024-08-13
Attending: EMERGENCY MEDICINE
Payer: COMMERCIAL

## 2024-08-13 VITALS
WEIGHT: 153 LBS | TEMPERATURE: 97 F | DIASTOLIC BLOOD PRESSURE: 88 MMHG | SYSTOLIC BLOOD PRESSURE: 162 MMHG | OXYGEN SATURATION: 100 % | BODY MASS INDEX: 26 KG/M2 | RESPIRATION RATE: 18 BRPM | HEART RATE: 63 BPM

## 2024-08-13 DIAGNOSIS — R51.9 NONINTRACTABLE HEADACHE, UNSPECIFIED CHRONICITY PATTERN, UNSPECIFIED HEADACHE TYPE: ICD-10-CM

## 2024-08-13 DIAGNOSIS — I10 UNCOMPLICATED HYPERTENSION: Primary | ICD-10-CM

## 2024-08-13 LAB
ANION GAP SERPL CALC-SCNC: 10 MMOL/L (ref 0–18)
BASOPHILS # BLD AUTO: 0.06 X10(3) UL (ref 0–0.2)
BASOPHILS NFR BLD AUTO: 0.8 %
BUN BLD-MCNC: 7 MG/DL (ref 9–23)
BUN/CREAT SERPL: 9.3 (ref 10–20)
CALCIUM BLD-MCNC: 9.6 MG/DL (ref 8.7–10.4)
CHLORIDE SERPL-SCNC: 108 MMOL/L (ref 98–112)
CO2 SERPL-SCNC: 25 MMOL/L (ref 21–32)
CREAT BLD-MCNC: 0.75 MG/DL
DEPRECATED RDW RBC AUTO: 42.4 FL (ref 35.1–46.3)
EGFRCR SERPLBLD CKD-EPI 2021: 98 ML/MIN/1.73M2 (ref 60–?)
EOSINOPHIL # BLD AUTO: 0.49 X10(3) UL (ref 0–0.7)
EOSINOPHIL NFR BLD AUTO: 6.6 %
ERYTHROCYTE [DISTWIDTH] IN BLOOD BY AUTOMATED COUNT: 12.5 % (ref 11–15)
GLUCOSE BLD-MCNC: 97 MG/DL (ref 70–99)
HCT VFR BLD AUTO: 37.4 %
HGB BLD-MCNC: 13.1 G/DL
IMM GRANULOCYTES # BLD AUTO: 0.01 X10(3) UL (ref 0–1)
IMM GRANULOCYTES NFR BLD: 0.1 %
LYMPHOCYTES # BLD AUTO: 2.69 X10(3) UL (ref 1–4)
LYMPHOCYTES NFR BLD AUTO: 36.2 %
MCH RBC QN AUTO: 32.4 PG (ref 26–34)
MCHC RBC AUTO-ENTMCNC: 35 G/DL (ref 31–37)
MCV RBC AUTO: 92.6 FL
MONOCYTES # BLD AUTO: 0.44 X10(3) UL (ref 0.1–1)
MONOCYTES NFR BLD AUTO: 5.9 %
NEUTROPHILS # BLD AUTO: 3.75 X10 (3) UL (ref 1.5–7.7)
NEUTROPHILS # BLD AUTO: 3.75 X10(3) UL (ref 1.5–7.7)
NEUTROPHILS NFR BLD AUTO: 50.4 %
OSMOLALITY SERPL CALC.SUM OF ELEC: 294 MOSM/KG (ref 275–295)
PLATELET # BLD AUTO: 349 10(3)UL (ref 150–450)
POTASSIUM SERPL-SCNC: 3.3 MMOL/L (ref 3.5–5.1)
RBC # BLD AUTO: 4.04 X10(6)UL
SODIUM SERPL-SCNC: 143 MMOL/L (ref 136–145)
TROPONIN I SERPL HS-MCNC: <3 NG/L
WBC # BLD AUTO: 7.4 X10(3) UL (ref 4–11)

## 2024-08-13 PROCEDURE — 80048 BASIC METABOLIC PNL TOTAL CA: CPT | Performed by: EMERGENCY MEDICINE

## 2024-08-13 PROCEDURE — 84484 ASSAY OF TROPONIN QUANT: CPT | Performed by: EMERGENCY MEDICINE

## 2024-08-13 PROCEDURE — 96375 TX/PRO/DX INJ NEW DRUG ADDON: CPT

## 2024-08-13 PROCEDURE — 71045 X-RAY EXAM CHEST 1 VIEW: CPT | Performed by: EMERGENCY MEDICINE

## 2024-08-13 PROCEDURE — 99284 EMERGENCY DEPT VISIT MOD MDM: CPT

## 2024-08-13 PROCEDURE — 93010 ELECTROCARDIOGRAM REPORT: CPT

## 2024-08-13 PROCEDURE — 96374 THER/PROPH/DIAG INJ IV PUSH: CPT

## 2024-08-13 PROCEDURE — 93005 ELECTROCARDIOGRAM TRACING: CPT

## 2024-08-13 PROCEDURE — 85025 COMPLETE CBC W/AUTO DIFF WBC: CPT | Performed by: EMERGENCY MEDICINE

## 2024-08-13 RX ORDER — DIPHENHYDRAMINE HYDROCHLORIDE 50 MG/ML
25 INJECTION INTRAMUSCULAR; INTRAVENOUS ONCE
Status: COMPLETED | OUTPATIENT
Start: 2024-08-13 | End: 2024-08-13

## 2024-08-13 RX ORDER — HYDROCHLOROTHIAZIDE 25 MG/1
25 TABLET ORAL ONCE
Status: COMPLETED | OUTPATIENT
Start: 2024-08-13 | End: 2024-08-13

## 2024-08-13 RX ORDER — HYDROCHLOROTHIAZIDE 25 MG/1
25 TABLET ORAL DAILY
Qty: 90 TABLET | Refills: 0 | Status: SHIPPED | OUTPATIENT
Start: 2024-08-13 | End: 2024-11-11

## 2024-08-13 RX ORDER — KETOROLAC TROMETHAMINE 15 MG/ML
15 INJECTION, SOLUTION INTRAMUSCULAR; INTRAVENOUS ONCE
Status: COMPLETED | OUTPATIENT
Start: 2024-08-13 | End: 2024-08-13

## 2024-08-13 RX ORDER — BUTALBITAL, ACETAMINOPHEN AND CAFFEINE 50; 325; 40 MG/1; MG/1; MG/1
1-2 TABLET ORAL EVERY 4 HOURS PRN
Qty: 20 TABLET | Refills: 0 | Status: SHIPPED | OUTPATIENT
Start: 2024-08-13

## 2024-08-13 RX ORDER — MIDAZOLAM HYDROCHLORIDE 1 MG/ML
1 INJECTION INTRAMUSCULAR; INTRAVENOUS ONCE
Status: COMPLETED | OUTPATIENT
Start: 2024-08-13 | End: 2024-08-13

## 2024-08-13 NOTE — TELEPHONE ENCOUNTER
Condition update:    Patient called (identified name and ),   Seen at ED  for hypertension.  Was started on amlodipine 5 mg.  Blood pressure 151/91 today.  It was 160/103 when she went to ER.  Advised her to take blood pressure daily, send update via [employer] at end of this week, and keep follow up appointment. Call if any concerns.  She stated understanding.    Future Appointments   Date Time Provider Department Center   2024  3:40 PM Ligia Joe APRN [facility]   2024  3:00 PM [facility] RM1 [facility] [place]   2024  [facility]00 [facility]   10/10/2024  3:00 PM Tory Bates MD [facility]

## 2024-08-13 NOTE — TELEPHONE ENCOUNTER
Dr. Regalado-     Patient calling back stating she is now experiencing a horrible headache and requesting medication  Has tried motrin, Ibuprofen, Excedrin, Tylenol with no relief  Last BP: 142/85 at 1:24 pm    Asked patient to check BP NOW: 155/101  Advised patient to go back to the ER now  Patient verbalized understanding and will go to [place] ED

## 2024-08-13 NOTE — ED INITIAL ASSESSMENT (HPI)
Patient presents to the ED c/o headache since Friday, hypertension x one week, and chest tightness since x one hour.   Pt was started on amlodipine on Sunday. Pt is A&ox4. No facial droop noted. Bilateral  strength is strong. Ambulating with steady gait. Reports taking tylenol around 1300.

## 2024-08-14 LAB
ATRIAL RATE: 84 BPM
P AXIS: 31 DEGREES
P-R INTERVAL: 128 MS
Q-T INTERVAL: 370 MS
QRS DURATION: 76 MS
QTC CALCULATION (BEZET): 437 MS
R AXIS: 4 DEGREES
T AXIS: 6 DEGREES
VENTRICULAR RATE: 84 BPM

## 2024-08-14 NOTE — ED PROVIDER NOTES
Patient Seen in: [facility] Emergency Department    History     Chief Complaint   Patient presents with    Headache    Hypertension    Chest Pain       HPI    The patient presents to the ED complaining of a headache starting over a week ago.  She states that her blood pressures been high as well over this time.  Went to the ED several days ago and had a CT of her brain which is normal.  She was started on amlodipine at that time.  She states blood pressure is now somewhat improved but still elevated.  She denies other complaints, headache is ongoing.  Denies other symptoms.    History reviewed.   Past Medical History:    Advanced maternal age (AMA) in pregnancy    AMA (advanced maternal age) multigravida 35+ (HCC)    High blood pressure    borderline     History of pregnancy     x3; 5 hr labor , 12 hr labor- , 5 hr labor- ,      Visual impairment    REader       History reviewed.   Past Surgical History:   Procedure Laterality Date    D & c                 Medications :  (Not in a hospital admission)       Family History   Problem Relation Age of Onset    Diabetes Mother     High Blood Pressure Mother     Depression Mother     Cancer Maternal Grandmother         pancreatic    Cancer Paternal Uncle         pancreatic       Smoking Status:   Social History     Socioeconomic History    Marital status:    Tobacco Use    Smoking status: Never     Passive exposure: Never    Smokeless tobacco: Never   Vaping Use    Vaping status: Never Used   Substance and Sexual Activity    Alcohol use: Yes     Comment: beer & liquor socially    Drug use: No   Other Topics Concern    Caffeine Concern Yes     Comment: soda, coffee 1 cup       Constitutional and vital signs reviewed.      Social History and Family History elements reviewed from today, pertinent positives to the presenting problem noted.    Physical Exam     ED Triage Vitals [24 1634]   BP (!) 161/93   Pulse 80   Resp  18   Temp 97.3 °F (36.3 °C)   Temp src Temporal   SpO2 97 %   O2 Device        All measures to prevent infection transmission during my interaction with the patient were taken. Handwashing was performed prior to and after the exam.  Stethoscope and any equipment used during my examination was cleaned with super sani-cloth germicidal wipes following the exam.     Physical Exam  Vitals and nursing note reviewed.   Constitutional:       General: She is not in acute distress.     Appearance: She is well-developed. She is not ill-appearing or toxic-appearing.   HENT:      Head: Normocephalic and atraumatic.   Eyes:      General:         Right eye: No discharge.         Left eye: No discharge.      Conjunctiva/sclera: Conjunctivae normal.   Neck:      Trachea: No tracheal deviation.   Cardiovascular:      Rate and Rhythm: Normal rate and regular rhythm.      Heart sounds: Normal heart sounds.   Pulmonary:      Effort: Pulmonary effort is normal. No respiratory distress.      Breath sounds: No stridor.   Abdominal:      General: There is no distension.      Palpations: Abdomen is soft.   Musculoskeletal:         General: No deformity.   Skin:     General: Skin is warm and dry.   Neurological:      General: No focal deficit present.      Mental Status: She is alert and oriented to person, place, and time.   Psychiatric:         Mood and Affect: Mood normal.         Behavior: Behavior normal.         ED Course        Labs Reviewed   BASIC METABOLIC PANEL (8) - Abnormal; Notable for the following components:       Result Value    Potassium 3.3 (*)     BUN 7 (*)     BUN/CREA Ratio 9.3 (*)     All other components within normal limits   TROPONIN I HIGH SENSITIVITY - Normal   CBC WITH DIFFERENTIAL WITH PLATELET     EKG    Rate, intervals and axes as noted on EKG Report.  Rate: Normal, 84 bpm  Rhythm: Sinus Rhythm  Reading: Normal intervals, nonspecific ST and T wave abnormality, abnormal EKG         As Interpreted by  me    Imaging Results Available and Reviewed while in ED: XR CHEST AP PORTABLE  (CPT=71045)    Result Date: 8/13/2024  CONCLUSION:   Negative for radiographically evident acute intrathoracic process.   Dictated by (CST): Antoni Nickerson MD on 8/13/2024 at 6:01 PM     Finalized by (CST): Antoni Nickerson MD on 8/13/2024 at 6:02 PM         ED Medications Administered:   Medications   ketorolac (Toradol) 15 MG/ML injection 15 mg (15 mg Intravenous Given 8/13/24 1723)   diphenhydrAMINE (Benadryl) 50 mg/mL  injection 25 mg (25 mg Intravenous Given 8/13/24 1808)   midazolam (Versed) 2 MG/2ML injection 1 mg (1 mg Intravenous Given 8/13/24 1808)   hydroCHLOROthiazide tab 25 mg (25 mg Oral Given 8/13/24 1829)         MDM     Vitals:    08/13/24 1634 08/13/24 1715 08/13/24 1900   BP: (!) 161/93 (!) 150/91 (!) 162/88   Pulse: 80 75 63   Resp: 18     Temp: 97.3 °F (36.3 °C)     TempSrc: Temporal     SpO2: 97% 98% 100%   Weight: 69.4 kg       *I personally reviewed and interpreted all ED vitals.    Pulse Ox: 100%, Room air, Normal     Monitor Interpretation:   normal sinus rhythm as interpreted by me.  The cardiac monitor was ordered to monitor heart rate.    Differential Diagnosis/ Diagnostic Considerations: Uncontrolled hypertension, hypertensive headache, other    Complicating Factors: The patient already has does not have any pertinent problems on file. to contribute to the complexity of this ED evaluation.    Medical Decision Making  The patient presents to the ED with ongoing headaches and ongoing hypertension.  Headache unchanged since prior evaluation where a CT was normal.  Patient nondistressed on examination.  Laboratory testing without concerning findings.  No evidence for ACS.  Hydrochlorothiazide was added and patient will continue amlodipine as well.  Stable for discharge and recommended prompt PCP follow-up for further evaluation.    Problems Addressed:  Nonintractable headache, unspecified chronicity pattern,  unspecified headache type: acute illness or injury  Uncomplicated hypertension: acute illness or injury that poses a threat to life or bodily functions    Amount and/or Complexity of Data Reviewed  Labs: ordered. Decision-making details documented in ED Course.  ECG/medicine tests: ordered and independent interpretation performed. Decision-making details documented in ED Course.    Risk  Prescription drug management.        Condition upon leaving the department: Stable    Disposition and Plan     Clinical Impression:  1. Uncomplicated hypertension    2. Nonintractable headache, unspecified chronicity pattern, unspecified headache type        Disposition:  Discharge    Follow-up:  No follow-up provider specified.    Medications Prescribed:  Discharge Medication List as of 8/13/2024  6:59 PM        START taking these medications    Details   hydroCHLOROthiazide 25 MG Oral Tab Take 1 tablet (25 mg total) by mouth daily., Normal, Disp-90 tablet, R-0

## 2024-08-19 ENCOUNTER — TELEPHONE (OUTPATIENT)
Dept: INTERNAL MEDICINE CLINIC | Facility: CLINIC | Age: 49
End: 2024-08-19

## 2024-08-19 ENCOUNTER — OFFICE VISIT (OUTPATIENT)
Dept: INTERNAL MEDICINE CLINIC | Facility: CLINIC | Age: 49
End: 2024-08-19
Payer: COMMERCIAL

## 2024-08-19 ENCOUNTER — LAB ENCOUNTER (OUTPATIENT)
Dept: LAB | Age: 49
End: 2024-08-19
Attending: NURSE PRACTITIONER
Payer: COMMERCIAL

## 2024-08-19 VITALS
HEIGHT: 64 IN | BODY MASS INDEX: 25.95 KG/M2 | RESPIRATION RATE: 16 BRPM | HEART RATE: 77 BPM | WEIGHT: 152 LBS | SYSTOLIC BLOOD PRESSURE: 127 MMHG | DIASTOLIC BLOOD PRESSURE: 86 MMHG

## 2024-08-19 DIAGNOSIS — I10 ESSENTIAL HYPERTENSION: ICD-10-CM

## 2024-08-19 DIAGNOSIS — Z09 HOSPITAL DISCHARGE FOLLOW-UP: Primary | ICD-10-CM

## 2024-08-19 DIAGNOSIS — E87.6 HYPOKALEMIA: Primary | ICD-10-CM

## 2024-08-19 DIAGNOSIS — R39.9 UTI SYMPTOMS: ICD-10-CM

## 2024-08-19 LAB
ANION GAP SERPL CALC-SCNC: 9 MMOL/L (ref 0–18)
BILIRUB UR QL: NEGATIVE
BUN BLD-MCNC: 9 MG/DL (ref 9–23)
BUN/CREAT SERPL: 11.4 (ref 10–20)
CALCIUM BLD-MCNC: 10.2 MG/DL (ref 8.7–10.4)
CHLORIDE SERPL-SCNC: 101 MMOL/L (ref 98–112)
CLARITY UR: CLEAR
CO2 SERPL-SCNC: 29 MMOL/L (ref 21–32)
CREAT BLD-MCNC: 0.79 MG/DL
EGFRCR SERPLBLD CKD-EPI 2021: 92 ML/MIN/1.73M2 (ref 60–?)
FASTING STATUS PATIENT QL REPORTED: NO
GLUCOSE BLD-MCNC: 101 MG/DL (ref 70–99)
GLUCOSE UR-MCNC: NORMAL MG/DL
KETONES UR-MCNC: NEGATIVE MG/DL
LEUKOCYTE ESTERASE UR QL STRIP.AUTO: 75
NITRITE UR QL STRIP.AUTO: NEGATIVE
OSMOLALITY SERPL CALC.SUM OF ELEC: 287 MOSM/KG (ref 275–295)
PH UR: 6 [PH] (ref 5–8)
POTASSIUM SERPL-SCNC: 2.8 MMOL/L (ref 3.5–5.1)
PROT UR-MCNC: NEGATIVE MG/DL
SODIUM SERPL-SCNC: 139 MMOL/L (ref 136–145)
SP GR UR STRIP: 1.01 (ref 1–1.03)
UROBILINOGEN UR STRIP-ACNC: NORMAL
WBC CLUMPS UR QL AUTO: PRESENT /HPF

## 2024-08-19 PROCEDURE — 80048 BASIC METABOLIC PNL TOTAL CA: CPT | Performed by: NURSE PRACTITIONER

## 2024-08-19 PROCEDURE — 81001 URINALYSIS AUTO W/SCOPE: CPT | Performed by: NURSE PRACTITIONER

## 2024-08-19 PROCEDURE — 36415 COLL VENOUS BLD VENIPUNCTURE: CPT | Performed by: NURSE PRACTITIONER

## 2024-08-19 PROCEDURE — 87186 SC STD MICRODIL/AGAR DIL: CPT | Performed by: NURSE PRACTITIONER

## 2024-08-19 PROCEDURE — 87088 URINE BACTERIA CULTURE: CPT | Performed by: NURSE PRACTITIONER

## 2024-08-19 PROCEDURE — 87086 URINE CULTURE/COLONY COUNT: CPT | Performed by: NURSE PRACTITIONER

## 2024-08-19 NOTE — PROGRESS NOTES
Steph Maza is a 49 year old female.  Chief Complaint   Patient presents with    ER F/U    Urinary Frequency     HPI:   She went to the ER due to having elevated BP and headaches.  She was prescribed Norvasc 5mg daily on . She then returned to the ER on  and was prescribed hydrochlorothiazide.     She is checking her BP at home. Her BP has been normal at home. Her latest reading was 128/79.     Her headaches have resolved.     She is also having UTI symptoms. She states her symptoms started on Saturday. She has had no fevers. She is having back pain. She denies any hematuria. She has taken AZO for her symptoms with some relief.     Current Outpatient Medications   Medication Sig Dispense Refill    hydroCHLOROthiazide 25 MG Oral Tab Take 1 tablet (25 mg total) by mouth daily. 90 tablet 0    butalbital-acetaminophen-caffeine -40 MG Oral Tab Take 1-2 tablets by mouth every 4 (four) hours as needed for Headaches. 20 tablet 0    amLODIPine 5 MG Oral Tab Take 1 tablet (5 mg total) by mouth daily. 30 tablet 0    ALPRAZolam 0.25 MG Oral Tab Take 1 tablet (0.25 mg total) by mouth 2 (two) times daily as needed. 60 tablet 1    sertraline 50 MG Oral Tab Take 1 tablet (50 mg total) by mouth daily. 90 tablet 1    Omeprazole 40 MG Oral Capsule Delayed Release Take 1 capsule (40 mg total) by mouth daily. 90 capsule 3    Alum & Mag Hydroxide-Simeth 200-200-20 MG/5ML Oral Suspension Take 10 mL by mouth 4 (four) times daily as needed. 355 mL 0      Past Medical History:    Advanced maternal age (AMA) in pregnancy    AMA (advanced maternal age) multigravida 35+ (HCC)    High blood pressure    borderline     History of pregnancy     x3; 5 hr labor , 12 hr labor- , 5 hr labor- ,      Visual impairment    REader      Past Surgical History:   Procedure Laterality Date    D & c              Social History:  Social History     Socioeconomic History    Marital status:     Tobacco Use    Smoking status: Never     Passive exposure: Never    Smokeless tobacco: Never   Vaping Use    Vaping status: Never Used   Substance and Sexual Activity    Alcohol use: Yes     Comment: beer & liquor socially    Drug use: No   Other Topics Concern    Caffeine Concern Yes     Comment: soda, coffee 1 cup      Family History   Problem Relation Age of Onset    Diabetes Mother     High Blood Pressure Mother     Depression Mother     Cancer Maternal Grandmother         pancreatic    Cancer Paternal Uncle         pancreatic      No Known Allergies     REVIEW OF SYSTEMS:     Review of Systems   Constitutional:  Negative for fever.   HENT: Negative.     Respiratory:  Negative for cough, shortness of breath and wheezing.    Cardiovascular:  Negative for chest pain.   Gastrointestinal: Negative.    Genitourinary:  Positive for dysuria, frequency and urgency. Negative for hematuria.   Musculoskeletal:  Positive for back pain.   Skin: Negative.    Neurological: Negative.    Psychiatric/Behavioral: Negative.        Wt Readings from Last 5 Encounters:   08/19/24 152 lb (68.9 kg)   08/13/24 153 lb (69.4 kg)   08/11/24 153 lb (69.4 kg)   08/06/24 156 lb (70.8 kg)   05/08/24 158 lb (71.7 kg)     Body mass index is 26.09 kg/m².      EXAM:   /86   Pulse 77   Resp 16   Ht 5' 4\" (1.626 m)   Wt 152 lb (68.9 kg)   LMP 08/11/2024 (Approximate)   BMI 26.09 kg/m²     Physical Exam  Vitals reviewed.   Constitutional:       Appearance: Normal appearance.   HENT:      Head: Normocephalic.   Cardiovascular:      Rate and Rhythm: Normal rate and regular rhythm.      Pulses: Normal pulses.   Pulmonary:      Breath sounds: Normal breath sounds. No wheezing.   Musculoskeletal:         General: No swelling. Normal range of motion.   Skin:     General: Skin is warm and dry.   Neurological:      Mental Status: She is alert and oriented to person, place, and time.   Psychiatric:         Mood and Affect: Mood normal.          Behavior: Behavior normal.            ASSESSMENT AND PLAN:   1. Hospital discharge follow-up  - hospital notes, imaging and blood work reviewed     2. Essential hypertension  - CPM  - BP stable   - check Basic today due to being on hydrochlorothiazide  - monitor BP at home   - follow up in one month with BP results   - Basic Metabolic Panel (8) [E]    3. UTI symptoms  - Urinalysis with Culture Reflex      The patient indicates understanding of these issues and agrees to the plan.  Return in about 1 month (around 9/19/2024).

## 2024-08-20 ENCOUNTER — HOSPITAL ENCOUNTER (EMERGENCY)
Facility: HOSPITAL | Age: 49
Discharge: HOME | End: 2024-08-20
Attending: STUDENT IN AN ORGANIZED HEALTH CARE EDUCATION/TRAINING PROGRAM
Payer: COMMERCIAL

## 2024-08-20 VITALS
SYSTOLIC BLOOD PRESSURE: 133 MMHG | WEIGHT: 151.88 LBS | DIASTOLIC BLOOD PRESSURE: 90 MMHG | RESPIRATION RATE: 18 BRPM | BODY MASS INDEX: 26 KG/M2 | TEMPERATURE: 98 F | OXYGEN SATURATION: 98 % | HEART RATE: 76 BPM

## 2024-08-20 DIAGNOSIS — E87.6 HYPOKALEMIA: ICD-10-CM

## 2024-08-20 DIAGNOSIS — N30.01 ACUTE CYSTITIS WITH HEMATURIA: Primary | ICD-10-CM

## 2024-08-20 LAB
ANION GAP SERPL CALC-SCNC: 7 MMOL/L (ref 0–18)
ATRIAL RATE: 84 BPM
B-HCG UR QL: NEGATIVE
BASOPHILS # BLD AUTO: 0.06 X10(3) UL (ref 0–0.2)
BASOPHILS NFR BLD AUTO: 0.6 %
BILIRUB UR QL CFM: NEGATIVE
BUN BLD-MCNC: 6 MG/DL (ref 9–23)
BUN/CREAT SERPL: 8.2 (ref 10–20)
CALCIUM BLD-MCNC: 9.5 MG/DL (ref 8.7–10.4)
CHLORIDE SERPL-SCNC: 103 MMOL/L (ref 98–112)
CO2 SERPL-SCNC: 29 MMOL/L (ref 21–32)
CREAT BLD-MCNC: 0.73 MG/DL
DEPRECATED RDW RBC AUTO: 40.2 FL (ref 35.1–46.3)
EGFRCR SERPLBLD CKD-EPI 2021: 101 ML/MIN/1.73M2 (ref 60–?)
EOSINOPHIL # BLD AUTO: 0.77 X10(3) UL (ref 0–0.7)
EOSINOPHIL NFR BLD AUTO: 8.2 %
ERYTHROCYTE [DISTWIDTH] IN BLOOD BY AUTOMATED COUNT: 12.1 % (ref 11–15)
GLUCOSE BLD-MCNC: 108 MG/DL (ref 70–99)
GLUCOSE UR-MCNC: NORMAL MG/DL
HCT VFR BLD AUTO: 40.3 %
HGB BLD-MCNC: 14.3 G/DL
IMM GRANULOCYTES # BLD AUTO: 0.03 X10(3) UL (ref 0–1)
IMM GRANULOCYTES NFR BLD: 0.3 %
KETONES UR-MCNC: NEGATIVE MG/DL
LEUKOCYTE ESTERASE UR QL STRIP.AUTO: 250
LYMPHOCYTES # BLD AUTO: 2.39 X10(3) UL (ref 1–4)
LYMPHOCYTES NFR BLD AUTO: 25.6 %
MAGNESIUM SERPL-MCNC: 2 MG/DL (ref 1.6–2.6)
MCH RBC QN AUTO: 32.3 PG (ref 26–34)
MCHC RBC AUTO-ENTMCNC: 35.5 G/DL (ref 31–37)
MCV RBC AUTO: 91 FL
MONOCYTES # BLD AUTO: 0.69 X10(3) UL (ref 0.1–1)
MONOCYTES NFR BLD AUTO: 7.4 %
NEUTROPHILS # BLD AUTO: 5.4 X10 (3) UL (ref 1.5–7.7)
NEUTROPHILS # BLD AUTO: 5.4 X10(3) UL (ref 1.5–7.7)
NEUTROPHILS NFR BLD AUTO: 57.9 %
OSMOLALITY SERPL CALC.SUM OF ELEC: 286 MOSM/KG (ref 275–295)
P AXIS: 46 DEGREES
P-R INTERVAL: 152 MS
PH UR: 6 [PH] (ref 5–8)
PLATELET # BLD AUTO: 319 10(3)UL (ref 150–450)
POTASSIUM SERPL-SCNC: 2.8 MMOL/L (ref 3.5–5.1)
PROT UR-MCNC: 30 MG/DL
Q-T INTERVAL: 344 MS
QRS DURATION: 82 MS
QTC CALCULATION (BEZET): 406 MS
R AXIS: 16 DEGREES
RBC # BLD AUTO: 4.43 X10(6)UL
RBC #/AREA URNS AUTO: >10 /HPF
SODIUM SERPL-SCNC: 139 MMOL/L (ref 136–145)
SP GR UR STRIP: 1.02 (ref 1–1.03)
T AXIS: -79 DEGREES
UROBILINOGEN UR STRIP-ACNC: 4
VENTRICULAR RATE: 84 BPM
WBC # BLD AUTO: 9.3 X10(3) UL (ref 4–11)
WBC #/AREA URNS AUTO: >50 /HPF

## 2024-08-20 PROCEDURE — 87186 SC STD MICRODIL/AGAR DIL: CPT | Performed by: STUDENT IN AN ORGANIZED HEALTH CARE EDUCATION/TRAINING PROGRAM

## 2024-08-20 PROCEDURE — 99285 EMERGENCY DEPT VISIT HI MDM: CPT

## 2024-08-20 PROCEDURE — 96375 TX/PRO/DX INJ NEW DRUG ADDON: CPT

## 2024-08-20 PROCEDURE — 93010 ELECTROCARDIOGRAM REPORT: CPT

## 2024-08-20 PROCEDURE — 93005 ELECTROCARDIOGRAM TRACING: CPT

## 2024-08-20 PROCEDURE — 87088 URINE BACTERIA CULTURE: CPT | Performed by: STUDENT IN AN ORGANIZED HEALTH CARE EDUCATION/TRAINING PROGRAM

## 2024-08-20 PROCEDURE — 87086 URINE CULTURE/COLONY COUNT: CPT | Performed by: STUDENT IN AN ORGANIZED HEALTH CARE EDUCATION/TRAINING PROGRAM

## 2024-08-20 PROCEDURE — 99284 EMERGENCY DEPT VISIT MOD MDM: CPT

## 2024-08-20 PROCEDURE — 96366 THER/PROPH/DIAG IV INF ADDON: CPT

## 2024-08-20 PROCEDURE — 96365 THER/PROPH/DIAG IV INF INIT: CPT

## 2024-08-20 PROCEDURE — 80048 BASIC METABOLIC PNL TOTAL CA: CPT | Performed by: STUDENT IN AN ORGANIZED HEALTH CARE EDUCATION/TRAINING PROGRAM

## 2024-08-20 PROCEDURE — 85025 COMPLETE CBC W/AUTO DIFF WBC: CPT

## 2024-08-20 PROCEDURE — 80048 BASIC METABOLIC PNL TOTAL CA: CPT

## 2024-08-20 PROCEDURE — 85025 COMPLETE CBC W/AUTO DIFF WBC: CPT | Performed by: STUDENT IN AN ORGANIZED HEALTH CARE EDUCATION/TRAINING PROGRAM

## 2024-08-20 PROCEDURE — 81001 URINALYSIS AUTO W/SCOPE: CPT | Performed by: STUDENT IN AN ORGANIZED HEALTH CARE EDUCATION/TRAINING PROGRAM

## 2024-08-20 PROCEDURE — 83735 ASSAY OF MAGNESIUM: CPT | Performed by: STUDENT IN AN ORGANIZED HEALTH CARE EDUCATION/TRAINING PROGRAM

## 2024-08-20 PROCEDURE — 81025 URINE PREGNANCY TEST: CPT

## 2024-08-20 RX ORDER — CEPHALEXIN 500 MG/1
500 CAPSULE ORAL 3 TIMES DAILY
Qty: 21 CAPSULE | Refills: 0 | Status: SHIPPED | OUTPATIENT
Start: 2024-08-20 | End: 2024-08-27

## 2024-08-20 RX ORDER — KETOROLAC TROMETHAMINE 15 MG/ML
15 INJECTION, SOLUTION INTRAMUSCULAR; INTRAVENOUS ONCE
Status: COMPLETED | OUTPATIENT
Start: 2024-08-20 | End: 2024-08-20

## 2024-08-20 RX ORDER — POTASSIUM CHLORIDE 1500 MG/1
40 TABLET, EXTENDED RELEASE ORAL ONCE
Status: COMPLETED | OUTPATIENT
Start: 2024-08-20 | End: 2024-08-20

## 2024-08-20 RX ORDER — AMLODIPINE BESYLATE 5 MG/1
10 TABLET ORAL DAILY
Qty: 60 TABLET | Refills: 0 | Status: SHIPPED | OUTPATIENT
Start: 2024-08-20 | End: 2024-09-19

## 2024-08-20 RX ORDER — CEPHALEXIN 500 MG/1
500 CAPSULE ORAL ONCE
Status: COMPLETED | OUTPATIENT
Start: 2024-08-20 | End: 2024-08-20

## 2024-08-20 RX ORDER — POTASSIUM CHLORIDE 1.5 G/1.58G
40 POWDER, FOR SOLUTION ORAL ONCE
Qty: 2 PACKET | Refills: 0 | Status: SHIPPED | OUTPATIENT
Start: 2024-08-21 | End: 2024-08-21

## 2024-08-20 RX ORDER — POTASSIUM CHLORIDE 1500 MG/1
40 TABLET, EXTENDED RELEASE ORAL 2 TIMES DAILY
Qty: 10 TABLET | Refills: 0 | Status: SHIPPED | OUTPATIENT
Start: 2024-08-20 | End: 2024-08-20

## 2024-08-20 RX ORDER — POTASSIUM CHLORIDE 14.9 MG/ML
20 INJECTION INTRAVENOUS ONCE
Status: COMPLETED | OUTPATIENT
Start: 2024-08-20 | End: 2024-08-20

## 2024-08-20 NOTE — ED INITIAL ASSESSMENT (HPI)
Patient ambulatory to ED with complaint of hypokalemia and UTI symptoms. States she was put on HTN meds and side effect has been hypokalemia.       Patient is AXOX4.

## 2024-08-20 NOTE — ED QUICK NOTES
Pt called out saying burning from K has returned and is unbearable. Spoke with pharmacy, rec IVF to run concurrently. Hot packs applied. MD aware and ok with plan. IVF started, pt states is has slightly improved.

## 2024-08-20 NOTE — TELEPHONE ENCOUNTER
Paged with critical potassium 2.8  Patient denies symptoms of palpitations, light headedness, chest pain or confusion  Speaking in complete sentences, calm   She did feel some weakness in her legs earlier today and wonders if she has a UTI - noted urine culture still pending   Also having some back pain  Notified her the low potassium is due to the hydrochlorothiazide and that she should start supplements for 5 days and then recheck  If she develops severe symptoms is to go to ER   She understands

## 2024-08-20 NOTE — TELEPHONE ENCOUNTER
Ligia CHICAS/ Adeline CHICAS ---   In Emergency Room now  - cancel potassium Prescription? If keeping it, please address quantity.     Potassium 20mEQ Tablet  If take 40 meq twice daily, for 5 days, then 20 quantity needed.       Pharmacy called office. Patient's date of birth and full name both confirmed.   Spoke with Radha - seeking clarification on quantity and directions     Potassium Supplement .     Patient currently in Emergency Room now.     Advised RN will inquire with providers.

## 2024-08-20 NOTE — TELEPHONE ENCOUNTER
Ok to discontinue potassium prescription. ER should treat her low K level.   Also she should discontinue hydrochlorothiazide and only continue norvasc for her BP.     Follow up in office next week. To recheck K level and blood pressure.

## 2024-08-20 NOTE — ED QUICK NOTES
Pt called out saying pain is unbearable again. K paused. PIV still intact and working appropriately. MD notified.

## 2024-08-20 NOTE — ED PROVIDER NOTES
Patient Seen in: [facility] Emergency Department      History     Chief Complaint   Patient presents with    Abnormal Result     Stated Complaint: abnormal results... low potassium    Subjective:   HPI    49-year-old female with recent diagnosis of hypertension presenting for evaluation of low potassium.  Had labs yesterday with potassium 2.8.  Earlier this month was started on amlodipine for elevated blood pressure, is on 5 mg, was seen subsequently and started on hydrochlorothiazide  25 mg as well -and has been maintained on this.  Since then she has had urinary frequency as well as back pain.  She has a urine culture that is in process she suspect she may have a UTI.  No fevers no vomiting.  Is not on supplemental potassium.    Objective:   Past Medical History:    Advanced maternal age (AMA) in pregnancy    AMA (advanced maternal age) multigravida 35+ (HCC)    High blood pressure    borderline     History of pregnancy     x3; 5 hr labor , 12 hr labor- , 5 hr labor- ,      Visual impairment    REader              Past Surgical History:   Procedure Laterality Date    D & c                        Social History     Socioeconomic History    Marital status:    Tobacco Use    Smoking status: Never     Passive exposure: Never    Smokeless tobacco: Never   Vaping Use    Vaping status: Never Used   Substance and Sexual Activity    Alcohol use: Yes     Comment: beer & liquor socially    Drug use: No   Other Topics Concern    Caffeine Concern Yes     Comment: soda, coffee 1 cup              Review of Systems    Positive for stated Chief Complaint: Abnormal Result    Other systems are as noted in HPI.  Constitutional and vital signs reviewed.      All other systems reviewed and negative except as noted above.    Physical Exam     ED Triage Vitals [24 0803]   BP (!) 152/93   Pulse 98   Resp 16   Temp 98.4 °F (36.9 °C)   Temp src Temporal   SpO2 100 %   O2 Device None  (Room air)       Current Vitals:   Vital Signs  BP: 133/90  Pulse: 76  Resp: 18  Temp: 98.4 °F (36.9 °C)  Temp src: Temporal  MAP (mmHg): (!) 103    Oxygen Therapy  SpO2: 98 %  O2 Device: None (Room air)            Physical Exam    Constitutional: awake, alert, no sig distress  HENT: mmm, no lesions,  Neck: normal range of motion, no tenderness, supple.  Eyes: PERRL, EOMI, conjunctiva normal, no discharge. Sclera anicteric.  Cardiovascular: rr no murmur  Respiratory: Normal breath sounds, no respiratory distress, no wheezing, no chest tenderness.  GI: Bowel sounds normal, Soft, no tenderness, no masses, no pulsatile masses.  : No CVA tenderness.  Skin: Warm, dry, no erythema, no rash.  Musculoskeletal: Intact distal pulses, no edema, no tenderness, no cyanosis, no clubbing. Good range of motion in all major joints. No tenderness to palpation or major deformities noted. Back- No tenderness.  Neurologic: Alert & oriented x 3, normal motor function, normal sensory function, no focal deficits noted.  Psych: Calm, cooperative, nl affect    ED Course     Labs Reviewed   CBC WITH DIFFERENTIAL WITH PLATELET - Abnormal; Notable for the following components:       Result Value    Eosinophil Absolute 0.77 (*)     All other components within normal limits   BASIC METABOLIC PANEL (8) - Abnormal; Notable for the following components:    Glucose 108 (*)     Potassium 2.8 (*)     BUN 6 (*)     BUN/CREA Ratio 8.2 (*)     All other components within normal limits   URINALYSIS WITH CULTURE REFLEX - Abnormal; Notable for the following components:    Clarity Urine Turbid (*)     Blood Urine 1+ (*)     Protein Urine 30 (*)     Urobilinogen Urine 4 (*)     Nitrite Urine 2+ (*)     Leukocyte Esterase Urine 250 (*)     WBC Urine >50 (*)     RBC Urine >10 (*)     Bacteria Urine 1+ (*)     Squamous Epi. Cells Few (*)     All other components within normal limits   MAGNESIUM - Normal   ICTOTEST - Normal   POCT PREGNANCY URINE - Normal    RAINBOW DRAW LAVENDER   RAINBOW DRAW LIGHT GREEN   RAINBOW DRAW BLUE   RAINBOW DRAW GOLD   URINE CULTURE, ROUTINE     EKG    Rate, intervals and axes as noted on EKG Report.  Rate: 84  Rhythm: Sinus Rhythm  Reading: SR, normal axis, normal intervals, nonspecific st and t wave changes, no stemi. Qtc 406ms                          MDM      49F hx as above p/w hypokalemia, urinary symptoms.   On arrival vss, reassuring  -hypokalemia likely r/t hydrochlorothiazide use. May benefit from discontinuation of this and increasing norvasc which she tolerated well.     Patient did not tolerate IV potassium replacement due to discomfort at IV site.  There is no signs of infiltration.  Potassium was slowed down, she is provided warm compress, however could not tolerate IV potassium.  Will discharge on supplemental potassium after discussing dosing with ER pharmacist Oseas.  Patient also with signs of UTI on repeat urinalysis, so given Keflex will discharge on same.      Return precautions and follow-up instructions were discussed with patient who voiced understanding and agreement the plan.  All questions were answered to patient satisfaction.                           Medical Decision Making      Disposition and Plan     Clinical Impression:  1. Acute cystitis with hematuria    2. Hypokalemia         Disposition:  Discharge  8/20/2024 12:05 pm    Follow-up:  Jaylin Regalado MD  [address] [facility] [id number] [phone]    Follow up in 2 day(s)      [facility] Emergency Department  [address] [facility] [id number] [phone]  Follow up  As needed, If symptoms worsen          Medications Prescribed:  Discharge Medication List as of 8/20/2024 12:11 PM        START taking these medications    Details   !! amLODIPine 5 MG Oral Tab Take 2 tablets (10 mg total) by mouth daily., Normal, Disp-60 tablet, R-0      cephalexin 500 MG Oral Cap Take 1 capsule (500 mg total) by mouth 3 (three) times daily  for 7 days., Normal, Disp-21 capsule, R-0      potassium chloride 20 MEQ Oral Powd Pack Take 40 mEq by mouth one time for 1 dose., Normal, Disp-2 packet, R-0       !! - Potential duplicate medications found. Please discuss with provider.

## 2024-08-20 NOTE — TELEPHONE ENCOUNTER
Advised patient of Ligia's note. Patient verbalized understanding.     Future Appointments   Date Time Provider Department [place]   8/27/2024  4:20 PM Ligia Joe APRN [facility]   9/12/2024  3:00 PM ADO [facility]IS RM1 [facility] [place]   9/19/2024  4:20 PM Ligia Joe APRN [facility]   9/24/2024  9:00 AM Shanti Baugh MD [facility]   10/10/2024  3:00 PM Tory Bates MD [facility]

## 2024-08-20 NOTE — DISCHARGE INSTRUCTIONS
You should return to the emergency department if you develop severe nausea and vomiting and  are unable to keep liquids down, if you develop severe back/flank or stomach pain, or if your  symptoms are not clearly improving at home. Some people with this infection are sent home on  a medication to numb the urinary tract. If you were sent home on this medication, do not be  alarmed if your urine turns dark orange or reddish, this is a safe side effect of the medication.      Take the potassium as prescribed tomorrow and call your primary care doctor to have your laboratory studies repeated.  Take the amlodipine 10 mg daily instead of 5 and please discontinue your hydrochlorothiazide.

## 2024-08-21 ENCOUNTER — TELEPHONE (OUTPATIENT)
Dept: INTERNAL MEDICINE CLINIC | Facility: CLINIC | Age: 49
End: 2024-08-21

## 2024-08-21 NOTE — TELEPHONE ENCOUNTER
The one I ordered has been discontinued (below)  Take 40 mEq by mouth in the morning and 40 mEq before bedtime. Do all this for 5 days. Dispense: 10 tablet, Refills: 0 ordered     Written by JUAN FRANCISCO Quinones on 8/20/24    They should fill the powder pack potassium chloride

## 2024-08-21 NOTE — TELEPHONE ENCOUNTER
JUAN FRANCISCO Quinones, please advise    [facility] in [place] called, spoke to Hank.   They received two different prescriptions for patient's potassium:     1) potassium chloride 20 MEQ oral powder pack  Take 40 mEq by mouth one time for 1 dose. Dispense: 2 packet, Refills: 0 ordered  Written by Aury, Nicolás Jack MD today, 8/21/24    2)           Take 40 mEq by mouth in the morning and 40 mEq before bedtime. Do all this for 5 days. Dispense: 10 tablet, Refills: 0 ordered    Written by JUAN FRANCISCO Quinones on 8/20/24

## 2024-08-21 NOTE — TELEPHONE ENCOUNTER
RN called [id number] and informed to discontinue the order from JUAN FRANCISCO Quinones and to fill the potassium packets prescribed by Dr. Jack.     Order will be processed.

## 2024-08-22 NOTE — PROGRESS NOTES
ED Culture Callback Results Review    Pharmacist reviewed culture results from ED visit .    Final urine culture positive for E. coli. Patient was prescribed Cephalexin (Keflex) on discharge. Current therapy is appropriate based on reported susceptibilities. No further intervention required at this time.      Oseas Blankenship PharmD  Emergency Medicine Pharmacist Specialist  08/22/24; 1:49 PM

## 2024-08-24 ENCOUNTER — LAB ENCOUNTER (OUTPATIENT)
Dept: LAB | Age: 49
End: 2024-08-24
Payer: COMMERCIAL

## 2024-08-24 DIAGNOSIS — E87.6 HYPOKALEMIA: ICD-10-CM

## 2024-08-24 LAB — POTASSIUM SERPL-SCNC: 4 MMOL/L (ref 3.5–5.1)

## 2024-08-24 PROCEDURE — 84132 ASSAY OF SERUM POTASSIUM: CPT

## 2024-08-24 PROCEDURE — 36415 COLL VENOUS BLD VENIPUNCTURE: CPT

## 2024-08-27 ENCOUNTER — OFFICE VISIT (OUTPATIENT)
Dept: INTERNAL MEDICINE CLINIC | Facility: CLINIC | Age: 49
End: 2024-08-27

## 2024-08-27 VITALS
WEIGHT: 156 LBS | HEART RATE: 96 BPM | HEIGHT: 64 IN | DIASTOLIC BLOOD PRESSURE: 85 MMHG | BODY MASS INDEX: 26.63 KG/M2 | SYSTOLIC BLOOD PRESSURE: 128 MMHG | RESPIRATION RATE: 16 BRPM

## 2024-08-27 DIAGNOSIS — Z87.440 HISTORY OF UTI: ICD-10-CM

## 2024-08-27 DIAGNOSIS — Z09 HOSPITAL DISCHARGE FOLLOW-UP: Primary | ICD-10-CM

## 2024-08-27 DIAGNOSIS — I10 ESSENTIAL HYPERTENSION: ICD-10-CM

## 2024-08-27 DIAGNOSIS — E87.6 HYPOKALEMIA: ICD-10-CM

## 2024-08-27 PROCEDURE — 3008F BODY MASS INDEX DOCD: CPT | Performed by: NURSE PRACTITIONER

## 2024-08-27 PROCEDURE — 99214 OFFICE O/P EST MOD 30 MIN: CPT | Performed by: NURSE PRACTITIONER

## 2024-08-27 PROCEDURE — 3079F DIAST BP 80-89 MM HG: CPT | Performed by: NURSE PRACTITIONER

## 2024-08-27 PROCEDURE — 3074F SYST BP LT 130 MM HG: CPT | Performed by: NURSE PRACTITIONER

## 2024-08-27 NOTE — PROGRESS NOTES
Steph Maza is a 49 year old female.  Chief Complaint   Patient presents with    ER F/U     HPI:   She presents for ER follow up. She went to the ER on  due to having UTI symptoms and low K.     She was started on norvasc 5mg daily and 25mg hydrochlorothiazide for BP. The low K level was likely due to hydrochlorothiazide. The hydrochlorothiazide was discontinued and Norvasc was increased to 10mg daily. She was discharged home with oral potassium. She had her K level checked on  and it was normal.     She was positive for UTI and treated with keflex. She denies any UTI symptoms. She is still taking keflex currently.     Overall she is feeling better and her BP is normal in office.     Current Outpatient Medications   Medication Sig Dispense Refill    amLODIPine 5 MG Oral Tab Take 2 tablets (10 mg total) by mouth daily. 60 tablet 0    cephalexin 500 MG Oral Cap Take 1 capsule (500 mg total) by mouth 3 (three) times daily for 7 days. 21 capsule 0    butalbital-acetaminophen-caffeine -40 MG Oral Tab Take 1-2 tablets by mouth every 4 (four) hours as needed for Headaches. 20 tablet 0    ALPRAZolam 0.25 MG Oral Tab Take 1 tablet (0.25 mg total) by mouth 2 (two) times daily as needed. 60 tablet 1    sertraline 50 MG Oral Tab Take 1 tablet (50 mg total) by mouth daily. 90 tablet 1    Omeprazole 40 MG Oral Capsule Delayed Release Take 1 capsule (40 mg total) by mouth daily. 90 capsule 3      Past Medical History:    Advanced maternal age (AMA) in pregnancy    AMA (advanced maternal age) multigravida 35+ (HCC)    High blood pressure    borderline     History of pregnancy     x3; 5 hr labor , 12 hr labor- , 5 hr labor- ,  2012    Visual impairment    REader      Past Surgical History:   Procedure Laterality Date    D & c              Social History:  Social History     Socioeconomic History    Marital status:    Tobacco Use    Smoking status: Never     Passive exposure:  Never    Smokeless tobacco: Never   Vaping Use    Vaping status: Never Used   Substance and Sexual Activity    Alcohol use: Yes     Comment: beer & liquor socially    Drug use: No   Other Topics Concern    Caffeine Concern Yes     Comment: soda, coffee 1 cup      Family History   Problem Relation Age of Onset    Diabetes Mother     High Blood Pressure Mother     Depression Mother     Cancer Maternal Grandmother         pancreatic    Cancer Paternal Uncle         pancreatic      No Known Allergies     REVIEW OF SYSTEMS:     Review of Systems   Constitutional:  Negative for fever.   HENT: Negative.     Respiratory:  Negative for cough, shortness of breath and wheezing.    Cardiovascular:  Negative for chest pain.   Gastrointestinal:  Negative for abdominal pain.   Genitourinary: Negative.    Musculoskeletal: Negative.    Skin: Negative.    Neurological: Negative.    Psychiatric/Behavioral: Negative.        Wt Readings from Last 5 Encounters:   08/27/24 156 lb (70.8 kg)   08/20/24 151 lb 14.4 oz (68.9 kg)   08/19/24 152 lb (68.9 kg)   08/13/24 153 lb (69.4 kg)   08/11/24 153 lb (69.4 kg)     Body mass index is 26.78 kg/m².      EXAM:   /85   Pulse 96   Resp 16   Ht 5' 4\" (1.626 m)   Wt 156 lb (70.8 kg)   LMP 08/11/2024 (Approximate)   BMI 26.78 kg/m²     Physical Exam  Vitals reviewed.   Constitutional:       Appearance: Normal appearance.   HENT:      Head: Normocephalic.   Cardiovascular:      Rate and Rhythm: Normal rate and regular rhythm.      Pulses: Normal pulses.   Pulmonary:      Breath sounds: Normal breath sounds. No wheezing.   Musculoskeletal:         General: No swelling. Normal range of motion.   Skin:     General: Skin is warm and dry.   Neurological:      Mental Status: She is alert and oriented to person, place, and time.   Psychiatric:         Mood and Affect: Mood normal.         Behavior: Behavior normal.            ASSESSMENT AND PLAN:   1. Hospital discharge follow-up  - hospital  notes and labs reviewed     2. Essential hypertension  - CPM   - BP stable in office   - Magnesium [E]; Future  - CARD ECHO 2D DOPPLER (CPT=93306); Future  - Basic Metabolic Panel (8) [E]    3. Hypokalemia  - Basic Metabolic Panel (8) [E]; Future    4. History of UTI  - Urinalysis with Culture Reflex; Future  - Urinalysis with Culture Reflex      The patient indicates understanding of these issues and agrees to the plan.

## 2024-09-06 ENCOUNTER — PATIENT MESSAGE (OUTPATIENT)
Dept: INTERNAL MEDICINE CLINIC | Facility: CLINIC | Age: 49
End: 2024-09-06

## 2024-09-06 ENCOUNTER — LAB ENCOUNTER (OUTPATIENT)
Dept: LAB | Age: 49
End: 2024-09-06
Attending: NURSE PRACTITIONER
Payer: COMMERCIAL

## 2024-09-06 LAB
ANION GAP SERPL CALC-SCNC: 7 MMOL/L (ref 0–18)
BILIRUB UR QL: NEGATIVE
BUN BLD-MCNC: 8 MG/DL (ref 9–23)
BUN/CREAT SERPL: 11.9 (ref 10–20)
CALCIUM BLD-MCNC: 9.7 MG/DL (ref 8.7–10.4)
CHLORIDE SERPL-SCNC: 109 MMOL/L (ref 98–112)
CLARITY UR: CLEAR
CO2 SERPL-SCNC: 27 MMOL/L (ref 21–32)
COLOR UR: YELLOW
CREAT BLD-MCNC: 0.67 MG/DL
EGFRCR SERPLBLD CKD-EPI 2021: 107 ML/MIN/1.73M2 (ref 60–?)
FASTING STATUS PATIENT QL REPORTED: NO
GLUCOSE BLD-MCNC: 93 MG/DL (ref 70–99)
GLUCOSE UR-MCNC: NORMAL MG/DL
KETONES UR-MCNC: NEGATIVE MG/DL
LEUKOCYTE ESTERASE UR QL STRIP.AUTO: 75
MAGNESIUM SERPL-MCNC: 2 MG/DL (ref 1.6–2.6)
NITRITE UR QL STRIP.AUTO: NEGATIVE
OSMOLALITY SERPL CALC.SUM OF ELEC: 294 MOSM/KG (ref 275–295)
PH UR: 7 [PH] (ref 5–8)
POTASSIUM SERPL-SCNC: 3.9 MMOL/L (ref 3.5–5.1)
PROT UR-MCNC: NEGATIVE MG/DL
SODIUM SERPL-SCNC: 143 MMOL/L (ref 136–145)
SP GR UR STRIP: <1.005 (ref 1–1.03)
UROBILINOGEN UR STRIP-ACNC: NORMAL

## 2024-09-06 PROCEDURE — 81001 URINALYSIS AUTO W/SCOPE: CPT | Performed by: NURSE PRACTITIONER

## 2024-09-06 PROCEDURE — 80048 BASIC METABOLIC PNL TOTAL CA: CPT | Performed by: NURSE PRACTITIONER

## 2024-09-06 PROCEDURE — 87086 URINE CULTURE/COLONY COUNT: CPT | Performed by: NURSE PRACTITIONER

## 2024-09-06 PROCEDURE — 36415 COLL VENOUS BLD VENIPUNCTURE: CPT | Performed by: NURSE PRACTITIONER

## 2024-09-06 PROCEDURE — 83735 ASSAY OF MAGNESIUM: CPT | Performed by: NURSE PRACTITIONER

## 2024-09-06 NOTE — TELEPHONE ENCOUNTER
[Last office visit on 8/27/24; urine culture in process] [employer]dw[employer]et message sent to patient in response to Arideas message received--->see message.

## 2024-09-09 ENCOUNTER — PATIENT MESSAGE (OUTPATIENT)
Dept: INTERNAL MEDICINE CLINIC | Facility: CLINIC | Age: 49
End: 2024-09-09

## 2024-09-09 NOTE — TELEPHONE ENCOUNTER
Ligia Joe, please advise     Patient has a question about her urine test result.    From: Steph Maza  To: Ligia Joe  Sent: 9/9/2024  7:31 AM CDT  Subject: Test    Good morning, I saw your message, do I still have blood in my urine?    Hi Steph,     Your urine culture is normal. There is no urinary tract infection.  Your kidney function is normal.  Your magnesium level is normal.     Ligia KATZ   Written by JUAN FRANCISCO Elliott on 9/8/2024  9:20 PM CDT  Seen by patient Steph Maza on 9/9/2024  7:22 AM

## 2024-09-09 NOTE — TELEPHONE ENCOUNTER
No red blood cells presents in the urine sample.     Component      Latest Ref Rng 9/6/2024   Color Urine      Yellow  Yellow    Clarity Urine      Clear  Clear    Spec Gravity      1.005 - 1.030  <1.005 (L)    Glucose Urine      Normal mg/dL Normal    Bilirubin Urine      Negative  Negative    Ketones, UA      Negative mg/dL Negative    Blood Urine      Negative  2+ !    PH Urine      5.0 - 8.0  7.0    Protein Urine      Negative mg/dL Negative    Urobilinogen Urine      Normal  Normal    Nitrite Urine      Negative  Negative    Leukocyte Esterase       Negative  75 !    WBC Urine      0 - 5 /HPF 1-5    RBC Urine      0 - 2 /HPF 0-2    Bacteria Urine      None Seen /HPF None Seen    SQUAM EPI CELLS UR      None Seen /HPF Few !    RENAL TUBULAR EPITHELIAL CELLS      None Seen /HPF None Seen    TRANSITIONAL EPI CELLS      None Seen /HPF None Seen    YEAST URINE      None Seen /HPF None Seen       Legend:  (L) Low  ! Abnormal

## 2024-09-10 NOTE — TELEPHONE ENCOUNTER
No red blood cells were present although results do show blood +2  Is this a concern or are there any follow up recommendations?

## 2024-09-12 ENCOUNTER — HOSPITAL ENCOUNTER (OUTPATIENT)
Dept: MAMMOGRAPHY | Age: 49
Discharge: HOME | End: 2024-09-12
Attending: INTERNAL MEDICINE
Payer: COMMERCIAL

## 2024-09-12 DIAGNOSIS — Z12.31 VISIT FOR SCREENING MAMMOGRAM: ICD-10-CM

## 2024-09-12 PROCEDURE — 77067 SCR MAMMO BI INCL CAD: CPT | Performed by: INTERNAL MEDICINE

## 2024-09-12 PROCEDURE — 77063 BREAST TOMOSYNTHESIS BI: CPT | Performed by: INTERNAL MEDICINE

## 2024-09-23 NOTE — PROGRESS NOTES
ID: Stepharturo Maza  : 1975  Date: 2024     Referred by Dr. Penelope Alexander MD    Chief Complaint   Patient presents with    Incontinence    Urinary Frequency       HPI:  49 year old female, G6, Vaginal deliveries 4, who presents for evaluation of urinary leaking for the past 2 years.   Looses urine with coughing, sneezing, laughing.   Voids every 2 hours during the day and x 2 at night.  Has rare UUI.  Treated for UTI on 24 (+ E. Coli). Denies history of recurrent UTIs.  Bowels are regular  Has a uterus.  Has IUD in place.    No dyspareunia.    PMHx: HTN, depression, anxiety.       Urogynecology Summary:  Urogynecology Summary  Prolapse: No  ANDREAS: Yes (cough,laugh and sneeze)  Urge Incontinence: Yes (rare)  Nocturia Frequency: 2  Frequency: 2 hours  Incomplete emptying: Yes  Constipation: No  Wears pad day?: 1 (liner)  Wears Pad Night?: 0  Activities are limited by UI/POP?: Yes  Currently Sexually Active: Yes          HISTORY:  Past Medical History:    Advanced maternal age (AMA) in pregnancy    AMA (advanced maternal age) multigravida 35+ (HCC)    High blood pressure    borderline     History of pregnancy     x3; 5 hr labor , 12 hr labor- , 5 hr labor- ,  2012    Visual impairment    REader      Past Surgical History:   Procedure Laterality Date    D & c              Family History   Problem Relation Age of Onset    Diabetes Mother     High Blood Pressure Mother     Depression Mother     Pancreatic Cancer Maternal Grandmother     Cancer Maternal Grandmother         pancreatic    Pancreatic Cancer Paternal Uncle     Cancer Paternal Uncle         pancreatic      Social History     Socioeconomic History    Marital status:    Tobacco Use    Smoking status: Never     Passive exposure: Never    Smokeless tobacco: Never   Vaping Use    Vaping status: Never Used   Substance and Sexual Activity    Alcohol use: Yes     Comment: beer & liquor socially    Drug use: No    Other Topics Concern    Caffeine Concern Yes     Comment: soda, coffee 1 cup        Allergies:  No Known Allergies    Medications:  Outpatient Medications Prior to Visit   Medication Sig Dispense Refill    amLODIPine 5 MG Oral Tab       ALPRAZolam 0.25 MG Oral Tab Take 1 tablet (0.25 mg total) by mouth 2 (two) times daily as needed. 60 tablet 1    sertraline 50 MG Oral Tab Take 1 tablet (50 mg total) by mouth daily. 90 tablet 1    Omeprazole 40 MG Oral Capsule Delayed Release Take 1 capsule (40 mg total) by mouth daily. 90 capsule 3    butalbital-acetaminophen-caffeine -40 MG Oral Tab Take 1-2 tablets by mouth every 4 (four) hours as needed for Headaches. (Patient not taking: Reported on 9/24/2024) 20 tablet 0     No facility-administered medications prior to visit.       Review of Systems:    A comprehensive 12 point review of systems was completed.  Pertinent positives noted in the the HPI.  Denies CP  Denies SOB    Vitals:  Resp 16   Ht 64\"   Wt 156 lb (70.8 kg)   LMP 09/08/2024 (Approximate)   BMI 26.78 kg/m²        GENERAL EXAM:  GENERAL:  Alert and oriented. Well-nourished, normally developed.  Thought and emotional status are appropriate, speech is understandable.  No acute distress.   HEAD: Normocephalic and atraumatic with normal hair distribution  LUNGS:  Normal respiratory effort.    ABDOMEN: Non tender to palpation, tone normal without rigidity or guarding, no masses present, no evidence of hernia.   EXTREMITIES:  Without edema, varicosities or lesions.   SKIN:  Warm and dry, with good color and turgor. No lesions.    PELVIC EXAM:  External Genitalia: Normal appearance for age. No atrophy, no lesions  Urethra: no atrophy, non tender  Bladder:no fullness, non tender  Vagina: no atrophy, no lesions   Cervix: no bleeding, no lesions, non tender  Uterus: soft, mobile, non tender  Adnexa:no masses, non tender  Perineum: non tender  Anus: no hemorrhoids  Rectum: deferred.     PELVIS FLOOR  NEUROMUSCULAR FUNCTION:  Strength:  1  Perineal Sensation:  Normal      PELVIC SUPPORT:  [place]:  0  Ant:  0  Post:  0  CST:  negative  UVJ: + hypermobile    The patient voided 275 ml in the privacy of the bathroom.  She was catheterized for PVR of 20 ml.  Urine dip + trace blood.  Specimen sent for C&S    Impression/Plan:    ICD-10-CM    1. Primary stress urinary incontinence  N39.3 POCT urinalysis dipstick[40819]     Urine Culture, Routine     Straight Cath PVR      2. Pelvic floor weakness  N81.89           Discussion Items:   Nonsurgical and surgical treatments for Stress Urinary Incontinence  Discussed dietary and behavioral modification, discussed pharmacologic and nonpharmacologic mgmt options for urinary symptoms. Discussed dietary & weight management with potential improvements in symptoms with weight loss.    Diagnostic Items:  Urodynamics  Urine C&S    Medications Discussed:  None    Treatment Plan, Non-surgical:   Declines pelvic floor PT    Treatment Plan, Surgical:   Interested in surgery for ANDREAS. Info provided on slings. Agrees to have urodynamic testing.     Pt verbalizes understanding of all above discussed information. Follow up after testing.   info provided.     Shanti Baugh MD, FACOG, FACS  Female Pelvic Medicine and  Reconstructive Surgery (Urogynecology)

## 2024-09-24 ENCOUNTER — OFFICE VISIT (OUTPATIENT)
Dept: UROLOGY | Facility: HOSPITAL | Age: 49
End: 2024-09-24
Attending: OBSTETRICS & GYNECOLOGY
Payer: COMMERCIAL

## 2024-09-24 VITALS — BODY MASS INDEX: 26.63 KG/M2 | RESPIRATION RATE: 16 BRPM | WEIGHT: 156 LBS | HEIGHT: 64 IN

## 2024-09-24 DIAGNOSIS — N81.89 PELVIC FLOOR WEAKNESS: ICD-10-CM

## 2024-09-24 DIAGNOSIS — N39.3 PRIMARY STRESS URINARY INCONTINENCE: Primary | ICD-10-CM

## 2024-09-24 LAB
CONTROL RUN WITHIN 24 HOURS?: YES
LEUKOCYTE ESTERASE URINE: NEGATIVE
NITRITE URINE: NEGATIVE

## 2024-09-24 PROCEDURE — 51701 INSERT BLADDER CATHETER: CPT | Performed by: OBSTETRICS & GYNECOLOGY

## 2024-09-24 PROCEDURE — 99212 OFFICE O/P EST SF 10 MIN: CPT

## 2024-09-24 PROCEDURE — 87086 URINE CULTURE/COLONY COUNT: CPT | Performed by: OBSTETRICS & GYNECOLOGY

## 2024-09-24 PROCEDURE — 81002 URINALYSIS NONAUTO W/O SCOPE: CPT | Performed by: OBSTETRICS & GYNECOLOGY

## 2024-09-24 RX ORDER — AMLODIPINE BESYLATE 5 MG/1
TABLET ORAL
COMMUNITY
Start: 2024-08-29

## 2024-10-02 RX ORDER — AMLODIPINE BESYLATE 5 MG/1
5 TABLET ORAL DAILY
Qty: 90 TABLET | Refills: 0 | Status: SHIPPED | OUTPATIENT
Start: 2024-10-02

## 2024-10-02 NOTE — TELEPHONE ENCOUNTER
Refill passed per [facility] protocol.  Medication is listed as patient reported.      Requested Prescriptions   Pending Prescriptions Disp Refills    amLODIPine 5 MG Oral Tab  0       Hypertension Medications Protocol Passed - 9/30/2024 11:01 AM        Passed - CMP or BMP in past 12 months        Passed - Last BP reading less than 140/90     BP Readings from Last 1 Encounters:   08/27/24 128/85               Passed - In person appointment or virtual visit in the past 12 mos or appointment in next 3 mos     Recent Outpatient Visits              1 week ago Primary stress urinary incontinence    [facility] Urogynecology Shanti Baugh MD    Office Visit    1 month ago Hospital discharge follow-up    [facility] Ligia Joe APRN    Office Visit    1 month ago Hospital discharge follow-up    [facility] Ligia Joe APRN    Office Visit    1 month ago Encounter for well woman exam with routine gynecological exam    [facility] - OB/GYN Penelope Alexander MD    Office Visit    4 months ago Physical exam    [facility] Jaylin Regalado MD    Office Visit          Future Appointments         Provider Department Appt Notes    In 1 month [facility] PROCEDURE [facility] Urogynecology uds bcbs ppo    In 1 month Tory Bates MD [facility] discuss colon/EGD    In 1 month Shanti Baugh MD [facility] Pelvic Medicine [facility] Urogynecology UDS FOLLOW UP                    Passed - EGFRCR or GFRNAA > 50     GFR Evaluation  EGFRCR: 107 , resulted on 9/6/2024             Recent Outpatient Visits              1 week ago Primary stress urinary incontinence    [facility]  Medicine - [facility] Urogynecology Shanti Baugh MD    Office Visit    1 month ago Hospital discharge follow-up    [facility] Ligia Joe APRN    Office Visit    1 month ago Hospital discharge follow-up    [facility] Ligia Joe APRN    Office Visit    1 month ago Encounter for well woman exam with routine gynecological exam    [facility] - OB/GYN AlexanderPenelope driscoll MD    Office Visit    4 months ago Physical exam    [facility] Jaylin Regalado MD    Office Visit          Future Appointments         Provider Department Appt Notes    In 1 month [facility] PROC[facility] Urogynecology uds bcbs ppo    In 1 month Tory Bates MD [facility] discuss colon/EGD    In 1 month Shanti Baugh MD [facility] Urogynecology UDS FOLLOW UP

## 2024-11-07 ENCOUNTER — OFFICE VISIT (OUTPATIENT)
Dept: UROLOGY | Facility: HOSPITAL | Age: 49
End: 2024-11-07
Attending: OBSTETRICS & GYNECOLOGY
Payer: COMMERCIAL

## 2024-11-07 VITALS — HEIGHT: 64 IN | WEIGHT: 156 LBS | BODY MASS INDEX: 26.63 KG/M2 | RESPIRATION RATE: 18 BRPM

## 2024-11-07 DIAGNOSIS — N39.3 PRIMARY STRESS URINARY INCONTINENCE: Primary | ICD-10-CM

## 2024-11-07 LAB
CONTROL RUN WITHIN 24 HOURS?: YES
NITRITE URINE: NEGATIVE

## 2024-11-07 PROCEDURE — 51741 ELECTRO-UROFLOWMETRY FIRST: CPT

## 2024-11-07 PROCEDURE — 51797 INTRAABDOMINAL PRESSURE TEST: CPT

## 2024-11-07 PROCEDURE — 51729 CYSTOMETROGRAM W/VP&UP: CPT

## 2024-11-07 PROCEDURE — 51784 ANAL/URINARY MUSCLE STUDY: CPT

## 2024-11-07 PROCEDURE — 81002 URINALYSIS NONAUTO W/O SCOPE: CPT

## 2024-11-07 NOTE — PATIENT INSTRUCTIONS
[facility] - [facility] UROGYNECOLOGY  [address]  [place] [address]  PH: [phone]  FAX: [phone]       Urodynamic Testing Discharge Instructions:    There are NO dietary or activity restrictions.  You may resume your normal schedule.      You may have mild discomfort for a few hours after your testing today.  There may be some mild burning when you urinate or you may see some blood in your urine.  These problems should not last more than 24 hours.  The following suggestions may minimize any symptoms you experience.    Drink 6-8 large glasses of water over the next 8 hours  A compress or sitz bath may be soothing  Tylenol or Ibuprofen may be taken as needed    If you experience any of the following, please call the office or, if after hours, the on-call physician at [phone].    Excessive pain  Bright red bloody discharge  Fever or chills  Continued urgency, frequency or burning with urination    Obtaining Test Results    Your urodynamic test will be interpreted by a specialist and available to the referring physician within 7-14 days.  Patients in our clinic are given an appointment to come back to discuss the results and any appropriate treatment recommendations.    Please do not hesitate to contact our office with any questions or concerns at [phone].    I acknowledge that I have received verbal and written discharge  instructions and that I understand these instructions clearly.    Patient Signature:    Date:    [facility]   [facility]    BOWEL REGIMEN    Constipation can have detrimental effects on bladder function and can worsen the symptoms of prolapse.  It is important to avoid constipation.    The first step for treating constipation is to increase the amount of fiber in your diet.  It is usually difficult to get enough fiber each day in the food we eat.  Therefore, the best way to increase fiber is to take a daily  fiber supplement.      _____Fiber Supplement  (Metamucil, Citrucel, Benefiber, etc)    Begin with 1 dose (as instructed on the package) every morning.    If the stool is too hard, or if the stool consists of small, hard, marbles, you may need to increase to 1 dose each morning and 1 dose each evening.    If you find it difficult to take the fiber as directed (i.e. mixed with water or juice), you can mix the fiber with your cereal or with applesauce.    Don’t worry if you have to increase the amount---fiber is not addictive and will not cause diarrhea.      _____Milk of Magnesia    Begin with 1 teaspoon every evening.  This is a very low dose---approximately one-sixth of the typical dose.  You may need to increase the amount depending on your results.      _____Miralax    Miralax is a laxative available over the counter.  It can be used on a long-term basis if necessary.  The usual starting dose is 1 capful of powder mixed in fluid each morning.  The dose can be adjusted up or down depending on results and consistency of stool.[facility]    HAVING A URINARY CATHETER AFTER SURGERY    What is a urinary catheter?  A catheter is a soft tube used to drain urine from your bladder.    Why do I need a catheter?  A urinary catheter is used to help with healing immediately after surgery.  It works to keep your bladder empty while you are recovering.  Surgery, swelling and anesthesia can cause the bladder to lose its normal sensations for a while.  The catheter may stay in place for a week or more after you go home.    Where will I go to get the catheter removed?  Your catheter will be removed in the office.  Please call the office to schedule a voiding trial with the nurse.    How will the catheter be removed?  A nurse will disconnect your catheter from the drainage bag.  She will attach a syringe to the catheter and fill the bladder with sterile water until you have a strong desire to  urinate.  The catheter will be removed and you will be able to go to the bathroom to empty your bladder.    Will the catheter need to be replaced?  You will need to urinate a specific amount, depending on how much you were initially able to hold.  Your catheter will only need to be replaced if you are not able to empty the specific amount.  If the catheter is replaced, your nurse will discuss with you when you need to return.    What do I do after the catheter is removed?  For the first 24 hours, you should go to the bathroom with your first urge or every 2-3 hours while you are awake.  Urinate before you go to bed and if you wake up in the night, you do not need to set an alarm to wake up.  Drink plenty of water (6-8 glasses) slowly throughout the day.  Avoid liquids containing caffeine.  Continue with any pain medications (Motrin) as directed by the nurse.  Continue with your current bowel regime; avoid constipation.    What if I have trouble emptying my bladder?  If you are having trouble emptying your bladder, try the following tips:  Urinate normally then stand up, walk around for a minute or two, sit back down on the commode and attempt to urinate (double void).  Sit in a tub of warm water and try to urinate in the tub.  Run warm water over your vaginal area while attempting to urinate.    When should I call the office?  If you are unable to urinate for 6 hours.  You feel you need to urinate but cannot.  Urinating frequently in small amounts.  You experience abdominal bloating, pressure or back pain.  You have a fever over 100.5 for 4 hours or above 101.0 anytime.  You have nausea and/or vomiting.  Burning/pain with urination.    Please feel free to call the nurse at [phone] with any questions 8am-4:30pm Monday-Friday.    If the office is closed, you may call the on call physician at [phone] or go to the emergency room.[facility]     Post-Operative  Guidelines    AVOID CONSTIPATION - Take Miralax: one capful in water or juice each morning.  You can also take each evening if needed.  No lifting over 10 lbs. (1 gallon of milk is 8 lbs.)  It is okay to walk up and down stairs and to walk outside, but be careful not to become fatigued.  Showers and tub baths are okay, even if you have a catheter or abdominal incision.  You may ride in a car immediately.  You may drive after 1-2 weeks.    Please call us for any of the following:  Temperature above 100.5 for 4 hours or above 101.0 at any time  Chest pain or trouble breathing  Vaginal bleeding heavier than a period  Redness, tenderness or swelling of your legs  Pain or burning when you urinate  Redness, pain or foul discharge from incision

## 2024-11-07 NOTE — PROCEDURES
Patient here for urodynamic testing.  Procedure explained and confirmed by patient.  See evaluation form for results.  Both verbal and written discharge instructions were given.  Patient tolerated procedure well and will follow up with Dr. Shanti Baugh on 2024 at 2 pm.    URODYNAMIC EVALUATION    PATIENT HISTORY:    Prolapse:  No  ANDREAS:  Yes  UUI:  Yes  Nocturia:  2  Frequency:  every 2-3 hours  Sense of Incomplete Emptying:  No  Constipation:  No (Bowel regimen reviewed. Handouts provided)  Last void prior to UDS testin.5 hours  Current urge to void?   Mild  OAB meds stopped prior to test?  No  Other symptoms?      Surgery?  []  No  [x]  Interested in surgery:   [x]  Yes, specify date: Surgical procedure scheduled on 2024 for MUS and Cystoscopy.     Surgical folder provided?  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      EXAM:  Urinalysis Dip:  Today's Results   Component Date Value    control run 2024 Yes     Blood Urine 2024 Trace (A)     Nitrite Urine 2024 Negative     Leukocyte esterase urine 2024 Trace       [place] ( >30 degrees ):  [x]  Mobile  []  Fixed    Perineal Sensation:  [x]  Normal  []  Abnormal    Additional Notes:    PROLAPSE:  []  Yes  [x]  No  Prolapse reduced for testing?  []  Yes  [x]  No  []  Pessary  []  Manual  []  Half Speculum    Additional Notes:    UROFLOWMETRY:  Unreduced  Voided Volume:              20             mL  Maximum Flow Rate:                366            mL/sec  Average flow rate:               2              mL/sec  Post-void Residual:             5              mL  Pattern:   []  Normal  [x]  Poor flow      [x]  Intermittent []  Other  Void:    []  Typical  []  Atypical    Additional Notes:    CYSTOMETRY:  Urethral Catheter:  Fr 7 / tdoc  Abd Catheter:      Fr 7 / tdoc   Infusion:  Water Rate 30-50 mL/min  Temp:  Room  Position:  [x]  Sit  []  Stand  []  Supine  First sensation:   53 mL  First desire to void:   167 mL  Strong desire to void:  253 mL  Maximum cystometric capacity:   253 mL  Detrusor Activity:  [x]  Unstable   []  Stable  Urge leakage?    [x]  Yes  []  No  Volume at 1st unhibited detrusor cont:   200 mL  Detrusor instability provoked by:    [x]  Spontaneous [x]  Coughing  [x]  Filling  [x]  Valsalva  []  Other    Additional Notes:      URETHRAL FUNCTION:  Valsava (vesical) Leak Point Pressures:    Volume Leak Point Pressure Leak?    Cough Valsalva      100mL 209 133    cm H2O []  Yes [x]  No   200mL 142 174    cm H2O []  Yes [x]  No   250 mL 157 184    cm H2O []  Yes [x]  No   250 mL  Standing 238 158   cm H2O [x]  Yes, with cough and valsalva,  However uninhibited contraction noted with cough and valsalva []  No     Genuine Stress Incontinence demonstrated?   []  Yes  [x]  No    Resting Urethral Pressure Profile:     Functional Urethral Length:         0.9 cm                               0.8          cm     Maximum UCP:                  63       cm                                69          cm       PRESSURE/FLOW STUDY:  Unreduced  Voided volume:   284 ++ missed commode     mL  Maximum flow rate:      32  mL/sec  Pressure Detrusor (at maximum flow):          47  cm H2O  Post void residual:        5       mL  Voiding mechanism:  []  Abnormal  []  Normal  []  Strain to void   []  Weak detrusor  Void:   [x]  Typical   []  Atypical    Additional Notes:  Uroflowmetry, cystometry, and pressure / flow study were completed using calibrated electronic equipment and air charged transducers.    EMG:  During fill: Reactive    During flow study: Reactive    11/7/2024 9:35 AM     PERFORMED BY:  Annette DALE RN                 [1]   No outpatient medications have been marked as taking for the 11/7/24 encounter (Office Visit) with [facility].

## 2024-11-12 ENCOUNTER — OFFICE VISIT (OUTPATIENT)
Dept: UROLOGY | Facility: HOSPITAL | Age: 49
End: 2024-11-12
Attending: OBSTETRICS & GYNECOLOGY
Payer: COMMERCIAL

## 2024-11-12 VITALS — RESPIRATION RATE: 16 BRPM | WEIGHT: 156 LBS | HEIGHT: 64 IN | BODY MASS INDEX: 26.63 KG/M2

## 2024-11-12 DIAGNOSIS — N39.41 URGE INCONTINENCE: ICD-10-CM

## 2024-11-12 DIAGNOSIS — N81.89 PELVIC FLOOR WEAKNESS: ICD-10-CM

## 2024-11-12 DIAGNOSIS — N39.3 PRIMARY STRESS URINARY INCONTINENCE: Primary | ICD-10-CM

## 2024-11-12 PROCEDURE — 99212 OFFICE O/P EST SF 10 MIN: CPT

## 2024-11-12 NOTE — PROGRESS NOTES
ID: Steph Maza  : 1975  Date: 2024    Chief Complaint   Patient presents with    Incontinence       HPI:  49 year old female, G6, Vaginal deliveries 4, who was last seen on 24. She  presented for evaluation of urinary leaking for the past 2 years.   Looses urine with coughing, sneezing, laughing.   Voids every 2 hours during the day and x 2 at night.  Has rare UUI.  Treated for UTI on 24 (+ E. Coli). Denies history of recurrent UTIs.  Bowels are regular  Has a uterus.  Has IUD in place.    No dyspareunia.    PMHx: HTN, depression, anxiety.     Initial exam demonstrated normal exam, no prolapse, no retention, urine culture negative. Interested in surgery. Returns after UDS.     Interval history:  Urodynamic testing undergone without complication on 24.  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 Also cough induced DO.  No urodynamic ANDREAS with cough or Valsalva at any volumes tested.   Pressure/flow study: Voided 284 ml with PVR of 5 ml.     States she is equally bothered by ANDREAS and UUI.     Urogynecology Summary:  Urogynecology Summary  Prolapse: No  ANDREAS: Yes  Urge Incontinence: Yes  Nocturia Frequency: 2  Frequency: 2 hours  Incomplete emptying: No  Constipation: No  Wears pad day?: 1  Wears Pad Night?: 1  Activities are limited by UI/POP?: No  Currently Sexually Active: Yes        Review of Systems:    A comprehensive 12 point review of systems was completed.  Pertinent positives noted in the the HPI.  Denies CP  Denies SOB    Vitals:  Resp 16   Ht 64\"   Wt 156 lb (70.8 kg)   LMP 2024 (Approximate)   BMI 26.78 kg/m²      General:  Alert and oriented. Well-nourished, normally developed.  Thought and emotional status are appropriate, speech is understandable.  No acute distress.  Pelvic: deferred.     Impression:    ICD-10-CM    1. Primary stress urinary incontinence  N39.3       2.  Urge incontinence  N39.41       3. Pelvic floor weakness  N81.89             Plan:  Discussed with patient management options for her symptoms. She is bothered by ANDREAS and UUI. Explained differences in treatment options and results of UDS.  Offered referral to pelvic floor PT and pharmacologic therapy for DO. She wants to move forward with surgery for ANDREAS. Has been scheduled for MUS and cysto on 11/25/24.     Thorough discussion of surgical risks, benefits, and alternatives including, but not limited to bleeding/clots, infection, injury to nearby organs (urethra, bladder, ureters, bowel, blood vessels), mesh erosion/exposure, dyspareunia, worsening UUI, persistent,  voiding dysfunction, and pain. Possible need for additional surgeries to address any complications reviewed. Discussed pain mgmt and potential need for narcotics. Discussed addiction potential with narcotics. [place]  reviewed.  We reviewed hospital stay and postoperative convalescence.  She understands she may need to go home with a catheter.    All questions answered. Pre-operative instructions reviewed. IULTD consent signed. Understands surgery is meant to treat ANDREAS symptoms and she may continue to be bothered by UUI that may require medical therapy.    Dr. Shanti Baugh MD, FACOG, FACS  Female Pelvic Medicine and  Reconstructive Surgery (Urogynecology)

## 2024-11-15 DIAGNOSIS — F41.1 GAD (GENERALIZED ANXIETY DISORDER): ICD-10-CM

## 2024-11-19 ENCOUNTER — TELEPHONE (OUTPATIENT)
Dept: UROLOGY | Facility: HOSPITAL | Age: 49
End: 2024-11-19

## 2024-11-19 DIAGNOSIS — Z01.818 PREOP TESTING: Primary | ICD-10-CM

## 2024-11-19 NOTE — TELEPHONE ENCOUNTER
Incoming TC from patient regarding upcoming surgery (MUS and cysto) with Dr. Baugh, 11/25/24. Patient with questions regarding if she needs any testing completed prior to surgery. Informed patient I will call her back with update on POC. Patient verbalizes understanding.    Outgoing TC to patient with update on questions. Discussed with Dr. Baugh, CBC needed preoperatively. Order placed, patient will complete at [facility]. Informed patient PAT will contact her within 24-48 hours prior to surgery. PAT number provided. Patient verbalizes understanding.    Patient requesting time off documentation for work. Requesting to be off due to surgery 11/25-11/27/24 as well as documentation of 6 week lift restrictions for work. Paperwork signed by Dr. Baugh, scanned into [employer], and faxed (#[phone]) to patient's place of work with success by [employer].

## 2024-11-20 RX ORDER — OMEPRAZOLE 40 MG/1
40 CAPSULE, DELAYED RELEASE ORAL DAILY
Qty: 90 CAPSULE | Refills: 3 | Status: SHIPPED | OUTPATIENT
Start: 2024-11-20

## 2024-11-20 NOTE — TELEPHONE ENCOUNTER
Physical Therapy  Att. Pt is off floor in [place].  Will try back as schedule allows Please review. Protocol Failed; No Protocol      Recent fills: 8/12/2024  Last Rx written: 5/8/2024  Last office visit: 5/8/2024    Recent Visits  Date Type Provider Dept   08/27/24 Office Visit Ligia Joe APRN [facility]   08/19/24 Office Visit Ligia Joe APRN [facility]           Requested Prescriptions   Pending Prescriptions Disp Refills    ALPRAZolam 0.25 MG Oral Tab 60 tablet 1     Sig: Take 1 tablet (0.25 mg total) by mouth 2 (two) times daily as needed.       Controlled Substance Medication Failed - 11/20/2024  2:06 PM        Failed - This medication is a controlled substance - forward to provider to refill               Future Appointments         Provider Department Appt Notes    In 1 month Shanti Baugh MD [facility] Uro[facility]necology [facility] OP          Recent Outpatient Visits              1 week ago Primary stress urinary incontinence    [facility] Shanti Baugh MD    Office Visit    1 week ago Primary stress urinary incontinence    [facility]    Office Visit    1 month ago Primary stress urinary incontinence    [facility]ro[facility] Shanti Baugh MD    Office Visit    2 months ago Hospital discharge follow-up    [facility] Ligia Joe APRN    Office Visit    3 months ago Hospital discharge follow-up    [facility] Ligia Joe APRN    Office Visit

## 2024-11-20 NOTE — TELEPHONE ENCOUNTER
Refill passed per [facility] protocol.  Requested Prescriptions   Pending Prescriptions Disp Refills    Omeprazole 40 MG Oral Capsule Delayed Release 90 capsule 3     Sig: Take 1 capsule (40 mg total) by mouth daily.       Gastrointestional Medication Protocol Passed - 11/20/2024 12:31 PM        Passed - In person appointment or virtual visit in the past 12 mos or appointment in next 3 mos     Recent Outpatient Visits              1 week ago Primary stress urinary incontinence    [facility] Shanti Baugh MD    Office Visit    1 week ago Primary stress urinary incontinence    [facility]    Office Visit    1 month ago Primary stress urinary incontinence    [facility] Uro[facility] Shanti Baugh MD    Office Visit    2 months ago [facility]    Office Visit    3 months ago Hospital discharge follow-up    [facility]    Office Visit          Future Appointments         Provider Department Appt Notes    In 1 month Shanti Baugh MD [facility] Urogynecology [facility] POST OP                      sertraline 50 MG Oral Tab 90 tablet 1     Sig: Take 1 tablet (50 mg total) by mouth daily.       Psychiatric Non-Scheduled (Anti-Anxiety) Passed - 11/20/2024 12:31 PM        Passed - In person appointment or virtual visit in the past 6 mos or appointment in next 3 mos     Recent Outpatient Visits              1 week ago Primary stress urinary incontinence    [facility] Urogynecology Shanti Baugh MD    Office Visit    1 week ago Primary stress urinary incontinence    [facility] Uro[facility]necology    Office Visit    1 month ago Primary stress urinary incontinence    [facility]ro[facility]ne[facility] Shanti Baugh MD    Office Visit    2 months ago Hospital discharge follow-up    [facility]    Office Visit    3 months ago Hospital discharge follow-up    [facility]    Office Visit          Future Appointments         Provider Department Appt Notes    In 1 month Shanti Baugh MD [facility] Urogynecology [facility] POST OP                    Passed - Depression Screening completed within the past 12 months           Future Appointments         Provider Department Appt Notes    In 1 month Shanti Baugh MD [facility] Urogynecology [facility] POST OP          Recent Outpatient Visits              1 week ago Primary stress urinary incontinence    [facility] Uro[facility] Shanti Baugh MD    Office Visit    1 week ago Primary stress urinary incontinence    [facility]    Office Visit    1 month ago Primary stress urinary incontinence    [facility]ro[facility]ne[facility] Shanti Baugh MD    Office Visit    2 months ago Hospital discharge follow-up    [facility]    Office Visit    3 months ago Hospital discharge follow-up    [facility]    Office Visit

## 2024-11-21 NOTE — DISCHARGE INSTRUCTIONS
Pelvic Medicine Postoperative Instructions:    1. AVOID CONSTIPATION:   -Take Miralax one capful in water or juice each morning.  You can also take each evening if needed.  -Take Fiber supplement along with Miralax as well.  2. No heavy lifting or strenuous physical activity for 4 weeks.    Showers and tub baths are okay, even if you have a catheter or abdominal incision.  4. You may ride in a car immediately.  5. You may drive as long as you are not taking narcotic medications.   6. Pelvic rest x 6 weeks.   7. Walking and stairs are OK.     Please call us for any of the following:  -Temperature above 100.5 for 4 hours or above 101.0 at any time.  -Chest pain or trouble breathing.  -Vaginal bleeding heavier than a period.  -Redness, tenderness or swelling of your legs  -Pain or burning when you urinate; or if you go home with a catheter, trouble with catheter draining.  -Redness, pain or foul discharge from incision.    Office telephone: [phone]  After hours: [phone]       HOME INSTRUCTIONS  [facility] HOME CARE INSTRUCTIONS: POST-OP ANESTHESIA  The medication that you received for sedation or general anesthesia can last up to 24 hours. Your judgment and reflexes may be altered, even if you feel like your normal self.      We Recommend:   Do not drive any motor vehicle or bicycle   Avoid mowing the lawn, playing sports, or working with power tools/applicances (power saws, electric knives or mixers)   That you have someone stay with you on your first night home   Do not drink alcohol or take sleeping pills or tranquilizers   Do not sign legal documents within 24 hours of your procedure   If you had a nerve block for your surgery, take extra care not to put any pressure on your arm or hand for 24 hours    It is normal:  For you to have a sore throat if you had a breathing tube during surgery (while you were asleep!). The sore throat should get better within 48 hours. You can gargle with warm salt water (1/2 tsp  in 4 oz warm water) or use a throat lozenge for comfort  To feel muscle aches or soreness especially in the abdomen, chest or neck. The achy feeling should go away in the next 24 hours  To feel weak, sleepy or \"wiped out\". Your should start feeling better in the next 24 hours.   To experience mild discomforts such as sore lip or tongue, headache, cramps, gas pains or a bloated feeling in your abdomen.   To experience mild back pain or soreness for a day or two if you had spinal or epidural anesthesia.   If you had laparoscopic surgery, to feel shoulder pain or discomfort on the day of surgery.   For some patients to have nausea after surgery/anesthesia    If you feel nausea or experience vomiting:   Try to move around less.   Eat less than usual or drink only liquids until the next morning   Nausea should resolve in about 24 hours    If you have a problem when you are at home:    Call your surgeons office   Discharge Instructions: After Your Surgery  You’ve just had surgery. During surgery, you were given medicine called anesthesia to keep you relaxed and free of pain. After surgery, you may have some pain or nausea. This is common. Here are some tips for feeling better and getting well after surgery.   Going home  Your healthcare provider will show you how to take care of yourself when you go home. They'll also answer your questions. Have an adult family member or friend drive you home. For the first 24 hours after your surgery:   Don't drive or use heavy equipment.  Don't make important decisions or sign legal papers.  Take medicines as directed.  Don't drink alcohol.  Have someone stay with you, if needed. They can watch for problems and help keep you safe.  Be sure to go to all follow-up visits with your healthcare provider. And rest after your surgery for as long as your provider tells you to.   Coping with pain  If you have pain after surgery, pain medicine will help you feel better. Take it as directed,  before pain becomes severe. Also, ask your healthcare provider or pharmacist about other ways to control pain. This might be with heat, ice, or relaxation. And follow any other instructions your surgeon or nurse gives you.      Stay on schedule with your medicine.     Tips for taking pain medicine  To get the best relief possible, remember these points:   Pain medicines can upset your stomach. Taking them with a little food may help.  Most pain relievers taken by mouth need at least 20 to 30 minutes to start to work.  Don't wait till your pain becomes severe before you take your medicine. Try to time your medicine so that you can take it before starting an activity. This might be before you get dressed, go for a walk, or sit down for dinner.  Constipation is a common side effect of some pain medicines. Call your healthcare provider before taking any medicines such as laxatives or stool softeners to help ease constipation. Also ask if you should skip any foods. Drinking lots of fluids and eating foods such as fruits and vegetables that are high in fiber can also help. Remember, don't take laxatives unless your surgeon has prescribed them.  Drinking alcohol and taking pain medicine can cause dizziness and slow your breathing. It can even be deadly. Don't drink alcohol while taking pain medicine.  Pain medicine can make you react more slowly to things. Don't drive or run machinery while taking pain medicine.  Your healthcare provider may tell you to take acetaminophen to help ease your pain. Ask them how much you're supposed to take each day. Acetaminophen or other pain relievers may interact with your prescription medicines or other over-the-counter (OTC) medicines. Some prescription medicines have acetaminophen and other ingredients in them. Using both prescription and OTC acetaminophen for pain can cause you to accidentally overdose. Read the labels on your OTC medicines with care. This will help you to clearly know  the list of ingredients, how much to take, and any warnings. It may also help you not take too much acetaminophen. If you have questions or don't understand the information, ask your pharmacist or healthcare provider to explain it to you before you take the OTC medicine.   Managing nausea  Some people have an upset stomach (nausea) after surgery. This is often because of anesthesia, pain, or pain medicine, less movement of food in the stomach, or the stress of surgery. These tips will help you handle nausea and eat healthy foods as you get better. If you were on a special food plan before surgery, ask your healthcare provider if you should follow it while you get better. Check with your provider on how your eating should progress. It may depend on the surgery you had. These general tips may help:   Don't push yourself to eat. Your body will tell you when to eat and how much.  Start off with clear liquids and soup. They're easier to digest.  Next try semi-solid foods as you feel ready. These include mashed potatoes, applesauce, and gelatin.  Slowly move to solid foods. Don’t eat fatty, rich, or spicy foods at first.  Don't force yourself to have 3 large meals a day. Instead eat smaller amounts more often.  Take pain medicines with a small amount of solid food, such as crackers or toast. This helps prevent nausea.  When to call your healthcare provider  Call your healthcare provider right away if you have any of these:   You still have too much pain, or the pain gets worse, after taking the medicine. The medicine may not be strong enough. Or there may be a complication from the surgery.  You feel too sleepy, dizzy, or groggy. The medicine may be too strong.  Side effects such as nausea or vomiting. Your healthcare provider may advise taking other medicines to .  Skin changes such as rash, itching, or hives. This may mean you have an allergic reaction. Your provider may advise taking other medicines.  The incision  looks different (for instance, part of it opens up).  Bleeding or fluid leaking from the incision site, and weren't told to expect that.  Fever of 100.4°F (38°C) or higher, or as directed by your provider.  Call 911  Call 911 right away if you have:   Trouble breathing  Facial swelling    If you have obstructive sleep apnea   You were given anesthesia medicine during surgery to keep you comfortable and free of pain. After surgery, you may have more apnea spells because of this medicine and other medicines you were given. The spells may last longer than normal.    At home:  Keep using the continuous positive airway pressure (CPAP) device when you sleep. Unless your healthcare provider tells you not to, use it when you sleep, day or night. CPAP is a common device used to treat obstructive sleep apnea.  Talk with your provider before taking any pain medicine, muscle relaxants, or sedatives. Your provider will tell you about the possible dangers of taking these medicines.  Contact your provider if your sleeping changes a lot even when taking medicines as directed.  [employer] last reviewed this educational content on 10/1/2021  © 3767-2914 [employer]. All rights reserved. This information is not intended as a substitute for professional medical care. Always follow your healthcare professional's instructions.

## 2024-11-22 ENCOUNTER — LAB ENCOUNTER (OUTPATIENT)
Dept: LAB | Age: 49
End: 2024-11-22
Attending: OBSTETRICS & GYNECOLOGY
Payer: COMMERCIAL

## 2024-11-22 DIAGNOSIS — Z01.818 PREOP TESTING: ICD-10-CM

## 2024-11-22 LAB
BASOPHILS # BLD AUTO: 0.06 X10(3) UL (ref 0–0.2)
BASOPHILS NFR BLD AUTO: 0.6 %
DEPRECATED RDW RBC AUTO: 43.1 FL (ref 35.1–46.3)
EOSINOPHIL # BLD AUTO: 0.76 X10(3) UL (ref 0–0.7)
EOSINOPHIL NFR BLD AUTO: 7.5 %
ERYTHROCYTE [DISTWIDTH] IN BLOOD BY AUTOMATED COUNT: 12.4 % (ref 11–15)
HCT VFR BLD AUTO: 36.9 %
HGB BLD-MCNC: 12.6 G/DL
IMM GRANULOCYTES # BLD AUTO: 0.03 X10(3) UL (ref 0–1)
IMM GRANULOCYTES NFR BLD: 0.3 %
LYMPHOCYTES # BLD AUTO: 3.69 X10(3) UL (ref 1–4)
LYMPHOCYTES NFR BLD AUTO: 36.3 %
MCH RBC QN AUTO: 32.3 PG (ref 26–34)
MCHC RBC AUTO-ENTMCNC: 34.1 G/DL (ref 31–37)
MCV RBC AUTO: 94.6 FL
MONOCYTES # BLD AUTO: 0.62 X10(3) UL (ref 0.1–1)
MONOCYTES NFR BLD AUTO: 6.1 %
NEUTROPHILS # BLD AUTO: 5 X10 (3) UL (ref 1.5–7.7)
NEUTROPHILS # BLD AUTO: 5 X10(3) UL (ref 1.5–7.7)
NEUTROPHILS NFR BLD AUTO: 49.2 %
PLATELET # BLD AUTO: 341 10(3)UL (ref 150–450)
RBC # BLD AUTO: 3.9 X10(6)UL
WBC # BLD AUTO: 10.2 X10(3) UL (ref 4–11)

## 2024-11-22 PROCEDURE — 36415 COLL VENOUS BLD VENIPUNCTURE: CPT

## 2024-11-22 PROCEDURE — 85025 COMPLETE CBC W/AUTO DIFF WBC: CPT

## 2024-11-22 RX ORDER — ALPRAZOLAM 0.25 MG/1
0.25 TABLET ORAL 2 TIMES DAILY PRN
Qty: 60 TABLET | Refills: 1 | Status: SHIPPED | OUTPATIENT
Start: 2024-11-22

## 2024-11-24 NOTE — H&P
[facility] INPATIENT H&P   GYN ADMISSION NOTE    ADMISSION DATE: 11/25/2024  ADMITTING PHYSICIAN:  Shanti Baugh MD  ATTENDING PHYSICIAN:  Shanti Baugh MD  PRIMARY CARE PHYSICIAN:  Jaylin Regalado MD  CODE STATUS:  No Order    CHIEF COMPLAINT/REASON FOR ADMISSION:  Urinary leaking.      HISTORY OF PRESENT ILLNESS:    Steph Maza is a 49 year old, G6, Vaginal deliveries 4, who presented for evaluation of urinary leaking for the past 2 years.   Looses urine with coughing, sneezing, laughing.   Voids every 2 hours during the day and x 2 at night.  Has rare UUI.  Treated for UTI on 8/20/24 (+ E. Coli). Denies history of recurrent UTIs.  Bowels are regular  Has a uterus.  Has IUD in place.    No dyspareunia.     PMHx: HTN, depression, anxiety.      Initial exam demonstrated normal exam, no prolapse, no retention, urine culture negative. Interested in surgery.   Had Urodynamic testing on 11/7/24. 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 Also cough induced DO. No urodynamic ANDREAS with cough or Valsalva at any volumes tested.   Pressure/flow study: Voided 284 ml with PVR of 5 ml.      States she is equally bothered by ANDREAS and UUI.     MEDICATIONS PRIOR TO ADMISSION:       Current Outpatient Medications:     Omeprazole 40 MG Oral Capsule Delayed Release, Take 1 capsule (40 mg total) by mouth daily. (Patient taking differently: Take 1 capsule (40 mg total) by mouth as needed.), Disp: 90 capsule, Rfl: 3    sertraline 50 MG Oral Tab, Take 1 tablet (50 mg total) by mouth daily. (Patient taking differently: Take 1 tablet (50 mg total) by mouth Noon.), Disp: 90 tablet, Rfl: 3    amLODIPine 5 MG Oral Tab, Take 1 tablet (5 mg total) by mouth daily. (Patient taking differently: Take 1 tablet (5 mg total) by mouth Noon.), Disp: 90 tablet, Rfl: 0    ALPRAZolam 0.25 MG Oral Tab, Take 1 tablet (0.25 mg total) by mouth 2 (two) times daily as needed., Disp: 60 tablet, Rfl:  1      ALLERGIES:    Allergies[1]    PAST MEDICAL HISTORY:    Past Medical History:    Advanced maternal age (AMA) in pregnancy    AMA (advanced maternal age) multigravida 35+ (HCC)    Anxiety state    Esophageal reflux    High blood pressure    borderline     History of pregnancy     x3; 5 hr labor , 12 hr labor- , 5 hr labor- ,      Incontinence    urine    Visual impairment    REader       SURGICAL HISTORY:    Past Surgical History:   Procedure Laterality Date    D & c               FAMILY HISTORY:    Family History   Problem Relation Age of Onset    Diabetes Mother     High Blood Pressure Mother     Depression Mother     Pancreatic Cancer Maternal Grandmother     Cancer Maternal Grandmother         pancreatic    Pancreatic Cancer Paternal Uncle     Cancer Paternal Uncle         pancreatic       SOCIAL HISTORY:    Social History     Socioeconomic History    Marital status:    Tobacco Use    Smoking status: Never     Passive exposure: Never    Smokeless tobacco: Never   Vaping Use    Vaping status: Never Used   Substance and Sexual Activity    Alcohol use: Yes     Comment: 2 monthly    Drug use: No   Other Topics Concern    Caffeine Concern Yes     Comment: soda, coffee 1 cup       REVIEW OF SYSTEMS:    As per HPI. Denies CP or SOB    OBJECTIVE:  VITAL SIGNS:    Ht 64\"   Wt 156 lb (70.8 kg)   LMP 2024 (Approximate)   BMI 26.78 kg/m²      PHYSICAL EXAMINATION:  GENERAL EXAM:  GENERAL:  Alert and oriented. Well-nourished, normally developed.  Thought and emotional status are appropriate, speech is understandable.  No acute distress.   HEAD: Normocephalic and atraumatic with normal hair distribution  LUNGS:  Normal respiratory effort.    ABDOMEN: Non tender to palpation, tone normal without rigidity or guarding, no masses present, no evidence of hernia.   EXTREMITIES:  Without edema, varicosities or lesions.   SKIN:  Warm and dry, with good color and turgor. No  lesions.     PELVIC EXAM:  External Genitalia: Normal appearance for age. No atrophy, no lesions  Urethra: no atrophy, non tender  Bladder:no fullness, non tender  Vagina: no atrophy, no lesions   Cervix: no bleeding, no lesions, non tender  Uterus: soft, mobile, non tender  Adnexa:no masses, non tender  Perineum: non tender  Anus: no hemorrhoids  Rectum: deferred.      PELVIS FLOOR NEUROMUSCULAR FUNCTION:  Strength:  1  Perineal Sensation:  Normal        PELVIC SUPPORT:  [place]:  0  Ant:  0  Post:  0  CST:  negative  UVJ: + hypermobile    ASSESSMENT:    Stress incontinence  Urge incontinence    PLAN:    Discussed with patient management options for her symptoms. She is bothered by ANDREAS and UUI. Explained differences in treatment options and results of UDS.  Offered referral to pelvic floor PT and pharmacologic therapy for DO. She wants to move forward with surgery for ANDREAS. Has been scheduled for MUS and cysto on 11/25/24.      Thorough discussion of surgical risks, benefits, and alternatives including, but not limited to bleeding/clots, infection, injury to nearby organs (urethra, bladder, ureters, bowel, blood vessels), mesh erosion/exposure, dyspareunia, worsening UUI, persistent,  voiding dysfunction, and pain. Possible need for additional surgeries to address any complications reviewed. Discussed pain mgmt and potential need for narcotics. Discussed addiction potential with narcotics. IL  reviewed.  We reviewed hospital stay and postoperative convalescence.  She understands she may need to go home with a catheter.     All questions answered. Pre-operative instructions reviewed. IULTD consent signed. Understands surgery is meant to treat ANDREAS symptoms and she may continue to be bothered by UUI that may require medical therapy.       Shanti Baugh MD   11/24/2024        [1] No Known Allergies

## 2024-11-25 ENCOUNTER — ANESTHESIA (OUTPATIENT)
Dept: SURGERY | Facility: HOSPITAL | Age: 49
End: 2024-11-25
Payer: COMMERCIAL

## 2024-11-25 ENCOUNTER — HOSPITAL ENCOUNTER (OUTPATIENT)
Facility: HOSPITAL | Age: 49
Setting detail: HOSPITAL OUTPATIENT SURGERY
Discharge: HOME | End: 2024-11-25
Attending: OBSTETRICS & GYNECOLOGY | Admitting: OBSTETRICS & GYNECOLOGY
Payer: COMMERCIAL

## 2024-11-25 ENCOUNTER — ANESTHESIA EVENT (OUTPATIENT)
Dept: SURGERY | Facility: HOSPITAL | Age: 49
End: 2024-11-25
Payer: COMMERCIAL

## 2024-11-25 VITALS
DIASTOLIC BLOOD PRESSURE: 78 MMHG | OXYGEN SATURATION: 98 % | TEMPERATURE: 99 F | SYSTOLIC BLOOD PRESSURE: 131 MMHG | HEIGHT: 64 IN | RESPIRATION RATE: 13 BRPM | HEART RATE: 77 BPM | BODY MASS INDEX: 26.78 KG/M2 | WEIGHT: 156.88 LBS

## 2024-11-25 DIAGNOSIS — Z98.890 POSTOPERATIVE STATE: Primary | ICD-10-CM

## 2024-11-25 LAB — B-HCG UR QL: NEGATIVE

## 2024-11-25 PROCEDURE — 81025 URINE PREGNANCY TEST: CPT

## 2024-11-25 PROCEDURE — 0TSD0ZZ REPOSITION URETHRA, OPEN APPROACH: ICD-10-PCS | Performed by: OBSTETRICS & GYNECOLOGY

## 2024-11-25 DEVICE — TRANSVAGINAL MID-URETHRAL SLING
Type: IMPLANTABLE DEVICE | Site: URETHRA | Status: FUNCTIONAL
Brand: ADVANTAGE FIT ULTRA SYSTEM

## 2024-11-25 RX ORDER — NALOXONE HYDROCHLORIDE 0.4 MG/ML
80 INJECTION, SOLUTION INTRAMUSCULAR; INTRAVENOUS; SUBCUTANEOUS AS NEEDED
Status: DISCONTINUED | OUTPATIENT
Start: 2024-11-25 | End: 2024-11-25

## 2024-11-25 RX ORDER — HYDROMORPHONE HYDROCHLORIDE 1 MG/ML
0.2 INJECTION, SOLUTION INTRAMUSCULAR; INTRAVENOUS; SUBCUTANEOUS EVERY 5 MIN PRN
Status: DISCONTINUED | OUTPATIENT
Start: 2024-11-25 | End: 2024-11-25

## 2024-11-25 RX ORDER — DEXAMETHASONE SODIUM PHOSPHATE 4 MG/ML
VIAL (ML) INJECTION AS NEEDED
Status: DISCONTINUED | OUTPATIENT
Start: 2024-11-25 | End: 2024-11-25 | Stop reason: SURG

## 2024-11-25 RX ORDER — HYDROMORPHONE HYDROCHLORIDE 1 MG/ML
0.4 INJECTION, SOLUTION INTRAMUSCULAR; INTRAVENOUS; SUBCUTANEOUS EVERY 5 MIN PRN
Status: DISCONTINUED | OUTPATIENT
Start: 2024-11-25 | End: 2024-11-25

## 2024-11-25 RX ORDER — LIDOCAINE HYDROCHLORIDE AND EPINEPHRINE 10; 10 MG/ML; UG/ML
INJECTION, SOLUTION INFILTRATION; PERINEURAL AS NEEDED
Status: DISCONTINUED | OUTPATIENT
Start: 2024-11-25 | End: 2024-11-25 | Stop reason: HOSPADM

## 2024-11-25 RX ORDER — LIDOCAINE HYDROCHLORIDE 10 MG/ML
INJECTION, SOLUTION EPIDURAL; INFILTRATION; INTRACAUDAL; PERINEURAL AS NEEDED
Status: DISCONTINUED | OUTPATIENT
Start: 2024-11-25 | End: 2024-11-25 | Stop reason: SURG

## 2024-11-25 RX ORDER — SODIUM CHLORIDE, SODIUM LACTATE, POTASSIUM CHLORIDE, CALCIUM CHLORIDE 600; 310; 30; 20 MG/100ML; MG/100ML; MG/100ML; MG/100ML
INJECTION, SOLUTION INTRAVENOUS CONTINUOUS
Status: DISCONTINUED | OUTPATIENT
Start: 2024-11-25 | End: 2024-11-25

## 2024-11-25 RX ORDER — HYDROMORPHONE HYDROCHLORIDE 1 MG/ML
0.6 INJECTION, SOLUTION INTRAMUSCULAR; INTRAVENOUS; SUBCUTANEOUS EVERY 5 MIN PRN
Status: DISCONTINUED | OUTPATIENT
Start: 2024-11-25 | End: 2024-11-25

## 2024-11-25 RX ORDER — LORATADINE 10 MG/1
10 TABLET ORAL DAILY
COMMUNITY

## 2024-11-25 RX ORDER — ACETAMINOPHEN 500 MG
1000 TABLET ORAL ONCE
Status: COMPLETED | OUTPATIENT
Start: 2024-11-25 | End: 2024-11-25

## 2024-11-25 RX ORDER — MORPHINE SULFATE 4 MG/ML
4 INJECTION, SOLUTION INTRAMUSCULAR; INTRAVENOUS EVERY 10 MIN PRN
Status: DISCONTINUED | OUTPATIENT
Start: 2024-11-25 | End: 2024-11-25

## 2024-11-25 RX ORDER — MORPHINE SULFATE 10 MG/ML
6 INJECTION, SOLUTION INTRAMUSCULAR; INTRAVENOUS EVERY 10 MIN PRN
Status: DISCONTINUED | OUTPATIENT
Start: 2024-11-25 | End: 2024-11-25

## 2024-11-25 RX ORDER — ONDANSETRON 2 MG/ML
INJECTION INTRAMUSCULAR; INTRAVENOUS AS NEEDED
Status: DISCONTINUED | OUTPATIENT
Start: 2024-11-25 | End: 2024-11-25 | Stop reason: SURG

## 2024-11-25 RX ORDER — HYDROCODONE BITARTRATE AND ACETAMINOPHEN 5; 325 MG/1; MG/1
1 TABLET ORAL EVERY 6 HOURS PRN
Qty: 15 TABLET | Refills: 0 | Status: SHIPPED | OUTPATIENT
Start: 2024-11-25

## 2024-11-25 RX ORDER — KETOROLAC TROMETHAMINE 30 MG/ML
INJECTION, SOLUTION INTRAMUSCULAR; INTRAVENOUS AS NEEDED
Status: DISCONTINUED | OUTPATIENT
Start: 2024-11-25 | End: 2024-11-25 | Stop reason: SURG

## 2024-11-25 RX ORDER — HYDROCODONE BITARTRATE AND ACETAMINOPHEN 5; 325 MG/1; MG/1
1 TABLET ORAL EVERY 6 HOURS PRN
Status: DISCONTINUED | OUTPATIENT
Start: 2024-11-25 | End: 2024-11-25

## 2024-11-25 RX ORDER — MIDAZOLAM HYDROCHLORIDE 1 MG/ML
INJECTION INTRAMUSCULAR; INTRAVENOUS AS NEEDED
Status: DISCONTINUED | OUTPATIENT
Start: 2024-11-25 | End: 2024-11-25 | Stop reason: SURG

## 2024-11-25 RX ORDER — MORPHINE SULFATE 4 MG/ML
2 INJECTION, SOLUTION INTRAMUSCULAR; INTRAVENOUS EVERY 10 MIN PRN
Status: DISCONTINUED | OUTPATIENT
Start: 2024-11-25 | End: 2024-11-25

## 2024-11-25 RX ADMIN — DEXAMETHASONE SODIUM PHOSPHATE 4 MG: 4 MG/ML VIAL (ML) INJECTION at 07:40:00

## 2024-11-25 RX ADMIN — ONDANSETRON 4 MG: 2 INJECTION INTRAMUSCULAR; INTRAVENOUS at 07:35:00

## 2024-11-25 RX ADMIN — MIDAZOLAM HYDROCHLORIDE 2 MG: 1 INJECTION INTRAMUSCULAR; INTRAVENOUS at 07:35:00

## 2024-11-25 RX ADMIN — KETOROLAC TROMETHAMINE 30 MG: 30 INJECTION, SOLUTION INTRAMUSCULAR; INTRAVENOUS at 08:10:00

## 2024-11-25 RX ADMIN — SODIUM CHLORIDE, SODIUM LACTATE, POTASSIUM CHLORIDE, CALCIUM CHLORIDE: 600; 310; 30; 20 INJECTION, SOLUTION INTRAVENOUS at 07:35:00

## 2024-11-25 RX ADMIN — LIDOCAINE HYDROCHLORIDE 50 MG: 10 INJECTION, SOLUTION EPIDURAL; INFILTRATION; INTRACAUDAL; PERINEURAL at 07:37:00

## 2024-11-25 RX ADMIN — SODIUM CHLORIDE, SODIUM LACTATE, POTASSIUM CHLORIDE, CALCIUM CHLORIDE: 600; 310; 30; 20 INJECTION, SOLUTION INTRAVENOUS at 08:18:00

## 2024-11-25 RX ADMIN — SODIUM CHLORIDE, SODIUM LACTATE, POTASSIUM CHLORIDE, CALCIUM CHLORIDE: 600; 310; 30; 20 INJECTION, SOLUTION INTRAVENOUS at 08:05:00

## 2024-11-25 NOTE — ANESTHESIA POSTPROCEDURE EVALUATION
Patient: Steph Maza    Procedure Summary       Date: 11/25/24 Room / Location: [facility] MAIN OR  / [facility] MAIN OR    Anesthesia Start: 0735 Anesthesia Stop: 0835    Procedures:       Placement of midurethral sling, cystoscopy (Urethra)      CYSTOSCOPY (Urethra) Diagnosis: (Stress incontinence)    Surgeons: Shanti Baugh MD Anesthesiologist: Aissatou Mcclain MD    Anesthesia Type: general ASA Status: 2            Anesthesia Type: general    Vitals Value Taken Time   /93 11/25/24 0834   Temp 98.6 °F (37 °C) 11/25/24 0834   Pulse 119 11/25/24 0834   Resp 15 11/25/24 0834   SpO2 100 % 11/25/24 0834   Vitals shown include unfiled device data.    [facility] AN Post Evaluation:   Patient Evaluated in PACU  Patient Participation: complete - patient participated  Level of Consciousness: sleepy but conscious  Pain Score: 0  Pain Management: adequate  Airway Patency:patent  Dental exam unchanged from preop  Yes    Nausea/Vomiting: none  Cardiovascular Status: acceptable  Respiratory Status: acceptable  Postoperative Hydration acceptable      Fouzia Lemons CRNA  11/25/2024 8:35 AM   Performed

## 2024-11-25 NOTE — OPERATIVE REPORT
Steph Maza  : 1975  MRN: [id number]  Date of Surgery: 2024     Pre-operative diagnosis:    1. Stress urinary incontinence      Post-operative diagnosis:  1. Stress urinary incontinence      Procedure:   1. Retropubic midurethral sling (Advantage Fit)  2. Cystoscopy.     Surgeon: Shanti Baugh MD     Assistant: None           Location: [place] [id number]     Anesthesia: General, via LMA     EBL:  50 cc     Complications:  None     Drains: None     Specimens: None     Findings: Normal bladder and urethra.      Procedure:     The patient was taken to Operating Room 14, where she was identified and general anesthesia was introduced. She was placed in dorsal lithotomy in [facility]. Surgical time out was performed.  She was prepped and draped in normal sterile fashion. The bladder was drained. The midurethral area was identified and 1% Lidocaine with epinephrine was injected in the periurethral area bilaterally.  A 2-cm incision was made in the midurethral area. The vaginal epithelium was dissected off the underlying periurethral tissue. The Advantage fit Ultra trocar was passed from the vaginal side to the retropubic side bilaterally. Exit markings had been made on the skin previously. Cystoscopy was performed with a 70-degree scope. There were no intravesical lesions, foreign body or bladder perforation. The urethra was normal. The sling was then placed in the midurethra in a tension free fashion. There was no evidence of tunneling of the vaginal mucosa.  The excess sling was trimmed.  The vaginal mucosa was closed in the midline with running 2-0 Vicryl suture. The suprapubic stab incisions were reapproximated with skin glue. Hemostasis was assured. EBL was 50 cc.  Anesthesia was reversed. There were no complications.      Shanti Baugh MD

## 2024-11-25 NOTE — ANESTHESIA PREPROCEDURE EVALUATION
Anesthesia PreOp Note    HPI:     Steph Maza is a 49 year old female who presents for preoperative consultation requested by: Shanti Baugh MD    Date of Surgery: 2024    Procedure(s):  Placement of midurethral sling, cystoscopy  CYSTOSCOPY  Indication: Stress incontinence    Relevant Problems   No relevant active problems       NPO:  Last Liquid Consumption Date: 24  Last Liquid Consumption Time:   Last Solid Consumption Date: 24  Last Solid Consumption Time:   Last Liquid Consumption Date: 24          History Review:  Patient Active Problem List    Diagnosis Date Noted    Urge incontinence 2024    Primary stress urinary incontinence 2024    Pelvic floor weakness 2024       Past Medical History:    Advanced maternal age (AMA) in pregnancy    AMA (advanced maternal age) multigravida 35+ (HCC)    Anxiety state    Esophageal reflux    High blood pressure    borderline     History of pregnancy     x3; 5 hr labor , 12 hr labor- , 5 hr labor- ,      Incontinence    urine    Visual impairment    REader       Past Surgical History:   Procedure Laterality Date    D & c               Prescriptions Prior to Admission[1]  Current Medications and Prescriptions Ordered in Epic[2]    Allergies[3]    Family History   Problem Relation Age of Onset    Diabetes Mother     High Blood Pressure Mother     Depression Mother     Pancreatic Cancer Maternal Grandmother     Cancer Maternal Grandmother         pancreatic    Pancreatic Cancer Paternal Uncle     Cancer Paternal Uncle         pancreatic     Social History     Socioeconomic History    Marital status:    Tobacco Use    Smoking status: Never     Passive exposure: Never    Smokeless tobacco: Never   Vaping Use    Vaping status: Never Used   Substance and Sexual Activity    Alcohol use: Yes     Comment: 2 monthly    Drug use: No   Other Topics Concern    Caffeine Concern Yes      Comment: soda, coffee 1 cup       Available pre-op labs reviewed.  Lab Results   Component Value Date    WBC 10.2 11/22/2024    RBC 3.90 11/22/2024    HGB 12.6 11/22/2024    HCT 36.9 11/22/2024    MCV 94.6 11/22/2024    MCH 32.3 11/22/2024    MCHC 34.1 11/22/2024    RDW 12.4 11/22/2024    .0 11/22/2024    URINEPREG Negative 11/25/2024     Lab Results   Component Value Date     09/06/2024    K 3.9 09/06/2024     09/06/2024    CO2 27.0 09/06/2024    BUN 8 (L) 09/06/2024    CREATSERUM 0.67 09/06/2024    GLU 93 09/06/2024    CA 9.7 09/06/2024          Vital Signs:  Body mass index is 26.93 kg/m².   height is 1.626 m (5' 4\") and weight is 71.2 kg (156 lb 14.4 oz). Her oral temperature is 97.9 °F (36.6 °C). Her blood pressure is 132/75 and her pulse is 81. Her respiration is 16 and oxygen saturation is 97%.   Vitals:    11/20/24 1348 11/25/24 0643   BP:  132/75   Pulse:  81   Resp:  16   Temp:  97.9 °F (36.6 °C)   TempSrc:  Oral   SpO2:  97%   Weight: 70.8 kg (156 lb) 71.2 kg (156 lb 14.4 oz)   Height: 1.626 m (5' 4\") 1.626 m (5' 4\")        Anesthesia Evaluation     Patient summary reviewed and Nursing notes reviewed    Airway   Mallampati: II  TM distance: >3 FB  Neck ROM: full  Dental      Pulmonary - normal exam   Cardiovascular - normal exam  Exercise tolerance: good  (+) hypertension well controlled    NYHA Classification: I  ECG reviewed  ROS comment: Narrative  Performed by: MUSE  Normal sinus rhythm  Nonspecific ST and T wave abnormality  Abnormal ECG  When compared with ECG of 13-AUG-2024 16:45,  Nonspecific T wave abnormality, worse in Lateral leads  Confirmed by LENCHO REDDY, BRANDI ([id number]) on 8/20/2024 1:06:02 PM  Specimen Collected: 08/20/24 08:08        Neuro/Psych    (+)  neuromuscular disease, anxiety/panic attacks,        GI/Hepatic/Renal    (+) GERD  (-) hepatitis, liver disease    Endo/Other    (-) diabetes mellitus, hypothyroidism  Abdominal      Other findings: Braces              Anesthesia Plan:   ASA:  2  Plan:   General  Airway:  LMA and ETT  Informed Consent Plan and Risks Discussed With:  Patient and spouse  Discussed plan with:  Surgeon, attending and CRNA  Provider Attestation (if preop done by other):  GA/LMA/ETT/PONV,dental damages etc      I have informed Steph Maza and/or legal guardian or family member of the nature of the anesthetic plan, benefits, risks including possible dental damage if relevant, major complications, and any alternative forms of anesthetic management.   All of the patient's questions were answered to the best of my ability. The patient desires the anesthetic management as planned.  RUPINDER HEMPHILL MD  11/25/2024 7:00 AM  Present on Admission:   Primary stress urinary incontinence   Urge incontinence           [1]   Medications Prior to Admission   Medication Sig Dispense Refill Last Dose/Taking    loratadine 10 MG Oral Tab Take 1 tablet (10 mg total) by mouth daily.   11/24/2024 at  9:00 AM    ALPRAZolam 0.25 MG Oral Tab Take 1 tablet (0.25 mg total) by mouth 2 (two) times daily as needed. 60 tablet 1 11/24/2024 at 10:00 PM    Omeprazole 40 MG Oral Capsule Delayed Release Take 1 capsule (40 mg total) by mouth daily. (Patient taking differently: Take 1 capsule (40 mg total) by mouth as needed.) 90 capsule 3 11/24/2024 at  8:00 AM    sertraline 50 MG Oral Tab Take 1 tablet (50 mg total) by mouth daily. (Patient taking differently: Take 1 tablet (50 mg total) by mouth Noon.) 90 tablet 3 11/24/2024 at  5:00 PM    amLODIPine 5 MG Oral Tab Take 1 tablet (5 mg total) by mouth daily. (Patient taking differently: Take 1 tablet (5 mg total) by mouth Noon.) 90 tablet 0 11/24/2024 at  5:00 PM   [2]   Current Facility-Administered Medications Ordered in Epic   Medication Dose Route Frequency Provider Last Rate Last Admin    lactated ringers infusion   Intravenous Continuous Shanti Baugh MD 20 mL/hr at 11/25/24 0655 New Bag at 11/25/24 0655    ceFAZolin (Ancef) 2g  in 10mL IV syringe premix  2 g Intravenous Once Shanti Baugh MD         No current [facility]-ordered outpatient medications on file.   [3] No Known Allergies

## 2024-11-25 NOTE — ANESTHESIA PROCEDURE NOTES
Airway  Date/Time: 11/25/2024 7:41 AM  Urgency: Elective      General Information and Staff    Patient location during procedure: OR  Anesthesiologist: Aissatou Mcclain MD  Resident/CRNA: Fouzia Lemons CRNA  Performed: CRNA   Performed by: Fouzia Lemons CRNA  Authorized by: Aissatou Mcclain MD      Indications and Patient Condition  Indications for airway management: anesthesia  Sedation level: deep  Preoxygenated: yes  Patient position: sniffing  Mask difficulty assessment: 0 - not attempted    Final Airway Details  Final airway type: supraglottic airway      Successful airway: classic  Size 3       Number of attempts at approach: 1    Additional Comments  Dentition as preop

## 2024-11-25 NOTE — INTERVAL H&P NOTE
Pre-op Diagnosis: Stress incontinence    The above referenced H&P was reviewed by Shanti Baugh MD on 11/25/2024, the patient was examined and no significant changes have occurred in the patient's condition since the H&P was performed.  I discussed with the patient and/or legal representative the potential benefits, risks and side effects of this procedure; the likelihood of the patient achieving goals; and potential problems that might occur during recuperation.  I discussed reasonable alternatives to the procedure, including risks, benefits and side effects related to the alternatives and risks related to not receiving this procedure.  We will proceed with procedure as planned.

## 2024-12-16 ENCOUNTER — PATIENT MESSAGE (OUTPATIENT)
Dept: INTERNAL MEDICINE CLINIC | Facility: CLINIC | Age: 49
End: 2024-12-16

## 2024-12-18 RX ORDER — AMLODIPINE BESYLATE 10 MG/1
10 TABLET ORAL DAILY
Qty: 90 TABLET | Refills: 0
Start: 2024-12-18

## 2024-12-18 NOTE — TELEPHONE ENCOUNTER
Yes per notes the dose should be 10mg daily.   Has the patient been checking her BP at home?   What are her current readings. She should continue 10mg daily if her blood pressure is in normal range.

## 2024-12-18 NOTE — TELEPHONE ENCOUNTER
KULWANT Joe APRN,    See patients' mychart messages questioning correct amlodipine dosage and advise further.  Refer to encounter 8/27/24 office notes it reads norvasc increased to 10 mg. Please confirm this and may need additional refills or new prescription.    Please reply to pool: EM RN TRIAGE

## 2024-12-19 RX ORDER — AMLODIPINE BESYLATE 5 MG/1
10 TABLET ORAL DAILY
Qty: 180 TABLET | Refills: 3 | Status: SHIPPED | OUTPATIENT
Start: 2024-12-19

## 2024-12-19 NOTE — TELEPHONE ENCOUNTER
Patient is out of medication and requesting refill is refill appropriate Medication refill pended for your review/approval         Office visit on 08/27/2024 with Ligia CHICAS Notes Below    \"She was started on norvasc 5mg daily and 25mg hydrochlorothiazide for BP. The low K level was likely due to hydrochlorothiazide. The hydrochlorothiazide was discontinued and Norvasc was increased to 10mg daily. She was discharged home with oral potassium. She had her K level checked on 8/24 and it was normal. \"    \"2. Essential hypertension  - CPM   - BP stable in office   - Magnesium [E]; Future  - CARD ECHO 2D DOPPLER (CPT=93306); Future  - Basic Metabolic Panel (8) [E]\"

## 2024-12-19 NOTE — TELEPHONE ENCOUNTER
Name and  verified.     Patient calling requesting a refill of her Amlodapine. Patient is out of medication because refill that was filled in October was filled from her original prescription from ER visit of 5mg which was not working for her, so she was increased to 10 mg by Ligia Nelson see office visit from . So patient has been taking two 5mg tablets and is now out.     Refill pended for approval for 10mg amlodipine.

## 2025-01-07 ENCOUNTER — OFFICE VISIT (OUTPATIENT)
Dept: UROLOGY | Facility: HOSPITAL | Age: 50
End: 2025-01-07
Attending: OBSTETRICS & GYNECOLOGY
Payer: COMMERCIAL

## 2025-01-07 VITALS — WEIGHT: 156 LBS | TEMPERATURE: 99 F | BODY MASS INDEX: 26.63 KG/M2 | RESPIRATION RATE: 18 BRPM | HEIGHT: 64 IN

## 2025-01-07 DIAGNOSIS — Z98.890 POST-OPERATIVE STATE: ICD-10-CM

## 2025-01-07 DIAGNOSIS — N39.41 URGE INCONTINENCE: Primary | ICD-10-CM

## 2025-01-07 LAB
CONTROL RUN WITHIN 24 HOURS?: YES
LEUKOCYTE ESTERASE URINE: NEGATIVE
NITRITE URINE: NEGATIVE

## 2025-01-07 PROCEDURE — 51701 INSERT BLADDER CATHETER: CPT | Performed by: OBSTETRICS & GYNECOLOGY

## 2025-01-07 PROCEDURE — 87086 URINE CULTURE/COLONY COUNT: CPT | Performed by: OBSTETRICS & GYNECOLOGY

## 2025-01-07 PROCEDURE — 81002 URINALYSIS NONAUTO W/O SCOPE: CPT | Performed by: OBSTETRICS & GYNECOLOGY

## 2025-01-07 PROCEDURE — 87077 CULTURE AEROBIC IDENTIFY: CPT | Performed by: OBSTETRICS & GYNECOLOGY

## 2025-01-07 PROCEDURE — 99212 OFFICE O/P EST SF 10 MIN: CPT

## 2025-01-07 PROCEDURE — 87186 SC STD MICRODIL/AGAR DIL: CPT | Performed by: OBSTETRICS & GYNECOLOGY

## 2025-01-07 RX ORDER — SOLIFENACIN SUCCINATE 5 MG/1
5 TABLET, FILM COATED ORAL DAILY
Qty: 90 TABLET | Refills: 3 | Status: SHIPPED | OUTPATIENT
Start: 2025-01-07

## 2025-01-07 NOTE — PROGRESS NOTES
Steph Maza  4/30/1975  Encounter date: 1/7/24    Chief Complaint   Patient presents with    Post-Op     5 weeks       HPI:  49 year old female who is status post MUS, cysto on 11/25/24. Surgery was indicated secondary to stress predominant mixed urinary incontinence. The procedure was uncomplicated. This is her 6 week post operative exam.    Doing better.  Voiding freely every 2-3 hours during the day and x 2 at night.  No ANDREAS.  Continues to have UUI.  Reports urgency and hesitancy for several days. Bowels are regular. Has been sexually active once with discomfort. Of note, had DO on preop UDS.     Objective:  Temp 98.6 °F (37 °C) (Oral)   Resp 18   Ht 64\"   Wt 156 lb (70.8 kg)   LMP 09/08/2024 (Approximate)   BMI 26.78 kg/m²     Gen: Alert and oriented, no acute distress  Respiratory: Normal respiratory effort  Abdomen: non-tender, non distended. No rebound or guarding. Sling sites intact.  Pelvic:  Cough stress test: Negative in the supine position.  External genitalia: Normal for age.   Vagina: No Atrophy.  Incision: Well healed. No abnormal discharge. No mesh exposure.   Tenderness: None  Pelvic floor muscle strength: 1/5    The patient voided 75 ml in the privacy of the bathroom. She was catheterized for PVR of 20 ml. Urine dip + mod blood. Specimen sent for C&S.     Impression:  Encounter Diagnoses   Name Primary?    Urge incontinence Yes    Post-operative state          Plan:  The patient has recovered well since surgery.  She has resolution of ANDREAS but reports ongoing UUI. Recommend to start Vesicare 5 mg PO daily. Reviewed the importance of bowel management and preventing constipation. Plan for follow up in 3 months, sooner prn.    Shanti Baugh MD, FACOG, FACS  Female Pelvic Medicine and  Reconstructive Surgery (Urogynecology)

## 2025-01-09 ENCOUNTER — TELEPHONE (OUTPATIENT)
Dept: UROLOGY | Facility: HOSPITAL | Age: 50
End: 2025-01-09

## 2025-01-09 DIAGNOSIS — N39.41 URGE INCONTINENCE: Primary | ICD-10-CM

## 2025-01-09 DIAGNOSIS — N39.0 ACUTE UTI: ICD-10-CM

## 2025-01-09 RX ORDER — NITROFURANTOIN 25; 75 MG/1; MG/1
100 CAPSULE ORAL 2 TIMES DAILY
Qty: 14 CAPSULE | Refills: 0 | Status: SHIPPED | OUTPATIENT
Start: 2025-01-09 | End: 2025-01-16

## 2025-01-09 NOTE — TELEPHONE ENCOUNTER
Telephone call to patient with test results    Ucx + for >100k Ecoli, pan sensitive    Frequent UTI's No   1/7/25 >100k Ecoli (pan sens)  Tx: NTF bid x 7 days  9/24/24 no growth  9/6/24 no growth  8/20/24 >100 ecoli, Keflex 500mg BID x 7 days    Currently on suppression? No  Allergies reviewed    Discussed with LUCIANA Lorenzo Macrobid 100 mg po bid x 7 days  Will send antibiotics to pt's preferred pharmacy    All questions answered  Patient understands and agrees to plan.  Encouraged to call us with any further questions or concerns.

## 2025-03-11 ENCOUNTER — TELEPHONE (OUTPATIENT)
Dept: UROLOGY | Facility: HOSPITAL | Age: 50
End: 2025-03-11

## 2025-03-11 ENCOUNTER — LAB ENCOUNTER (OUTPATIENT)
Dept: LAB | Age: 50
End: 2025-03-11
Attending: PHYSICIAN ASSISTANT
Payer: COMMERCIAL

## 2025-03-11 DIAGNOSIS — R30.0 DYSURIA: Primary | ICD-10-CM

## 2025-03-11 DIAGNOSIS — R30.0 DYSURIA: ICD-10-CM

## 2025-03-11 PROCEDURE — 87088 URINE BACTERIA CULTURE: CPT

## 2025-03-11 PROCEDURE — 87186 SC STD MICRODIL/AGAR DIL: CPT

## 2025-03-11 PROCEDURE — 87086 URINE CULTURE/COLONY COUNT: CPT

## 2025-03-11 RX ORDER — NITROFURANTOIN 25; 75 MG/1; MG/1
100 CAPSULE ORAL 2 TIMES DAILY
Qty: 14 CAPSULE | Refills: 0 | Status: SHIPPED | OUTPATIENT
Start: 2025-03-11 | End: 2025-03-18

## 2025-03-11 NOTE — TELEPHONE ENCOUNTER
Telephone call received from patient.     Patient complains of UTI signs and symptoms including urgency, frequency, dysuria, and back pain x 3 days  Denies fever  Taking AZO with minimal relief.  Allergies and previous cultures reviewed    Hx incomplete emptying: No  Using Estrace: No    Recent ucxs:    1/7/25 >100k Ecoli (pan sens)  Tx: NTF bid x 7 days    9/24/24 no growth  9/6/24 no growth    8/20/24 >100 ecoli, Keflex 500mg BID x 7d    Discussed with Alexsandra MENJIVAR, TORB Macrobid 100 mg PO bid x 7 days    Urine culture ordered, will test @ [facility]    Empiric antibiotics sent to patient's preferred pharmacy.   Encouraged PO hydration.  All questions answered    Encouraged to call if symptoms worsen or fail to improve  Patient understands and agrees to plan

## 2025-03-13 ENCOUNTER — TELEPHONE (OUTPATIENT)
Dept: UROLOGY | Facility: HOSPITAL | Age: 50
End: 2025-03-13

## 2025-03-13 NOTE — TELEPHONE ENCOUNTER
TC to patient.  No answer.  Detailed message left with information below.  Urine cx positive for 10-50k Ecoli.  Complete NTF as prescribed.  Call us to schedule in office visit for void assessment next week.  Call back number provided.  Will follow.    Addendum:  IC from patient.  Reviewed information above.  Verbalized understanding and agrees to plan.  Transferred to  to schedule appt.

## 2025-03-18 ENCOUNTER — OFFICE VISIT (OUTPATIENT)
Dept: UROLOGY | Facility: HOSPITAL | Age: 50
End: 2025-03-18
Attending: PHYSICIAN ASSISTANT
Payer: COMMERCIAL

## 2025-03-18 VITALS — WEIGHT: 156 LBS | HEIGHT: 64 IN | BODY MASS INDEX: 26.63 KG/M2 | RESPIRATION RATE: 16 BRPM

## 2025-03-18 DIAGNOSIS — Z98.890 POST-OPERATIVE STATE: ICD-10-CM

## 2025-03-18 DIAGNOSIS — N39.41 URGE INCONTINENCE: ICD-10-CM

## 2025-03-18 DIAGNOSIS — N81.84 PELVIC MUSCLE WASTING: ICD-10-CM

## 2025-03-18 DIAGNOSIS — N39.0 FREQUENT UTI: Primary | ICD-10-CM

## 2025-03-18 PROCEDURE — 99212 OFFICE O/P EST SF 10 MIN: CPT

## 2025-03-18 RX ORDER — NITROFURANTOIN MACROCRYSTALS 100 MG/1
100 CAPSULE ORAL NIGHTLY
Qty: 90 CAPSULE | Refills: 1 | Status: SHIPPED | OUTPATIENT
Start: 2025-03-18

## 2025-03-18 RX ORDER — ESTRADIOL 0.1 MG/G
CREAM VAGINAL
Qty: 42.5 G | Refills: 3 | Status: SHIPPED | OUTPATIENT
Start: 2025-03-18

## 2025-03-18 NOTE — PROGRESS NOTES
Patient presents to follow up frequent UTIs    S/p MUS, cysto on 11/25/24 with Dr. Baugh    Reports being unsure if she empties well  Denies ANDREAS  Some UUI  Nocturia x1  Bowels reg  Denies current UTI s/sx    Currently taking solifenacin 5 mg  Reports some dry mouth but tolerable  Reports +improvement in urgency    Urine Hx:  3/11/25 10-50k Ecoli  Fernandez sensitive  Tx:  NTF  bid x 7 days    1/7/25 >100k Ecoli   pan sens  Tx: NTF bid x 7 days    9/24/24 no growth  9/6/24 no growth    8/20/24 >100 ecoli, Keflex 500mg BID x 7d    UDS 11/7/24:  20/5 mL & 284/5 mL  jail 253 mL  DO @ 200 mL  Cough induced DO  No ANDREAS    Urogynecology Summary:  Urogynecology Summary  Prolapse: No  ANDREAS: No  Urge Incontinence: Yes (sometimes)  Nocturia Frequency: 1  Frequency: 2 hours  Incomplete emptying: Yes (unsure if she empties)  Constipation: No  Wears pad day?: 3  Wears Pad Night?: 1  Activities are limited by UI/POP?: No  Currently Sexually Active: Yes    Vitals:  Resp 16   Ht 64\"   Wt 156 lb (70.8 kg)   LMP 09/08/2024 (Approximate)   BMI 26.78 kg/m²     GENERAL EXAM:  GENERAL: alert & oriented, NAD  HEENT: NC/AT, sclera without injection  SKIN: no rashes  LUNGS:  without increased respiratory effort  ABDOMEN: soft, non-tender, non-distended, no masses appreciated  EXT: no edema    PELVIC EXAM: MONE Mckenzie, present for exam as a chaperone  Cough stress test: Negative in the supine position.  External genitalia: Normal for age.   Vagina: +bleeding c/w menses.  No Atrophy.  Incision: Well healed. No mesh exposure.   Tenderness: None  Pelvic floor muscle strength: 1/5    Pt voided 50 mL in the privacy of the bathroom  After obtaining verbal consent from the patient, sterile technique used to prep urethra and collect urine.  PVR 5 mL.      Impression/Plan:    ICD-10-CM    1. Frequent UTI  N39.0 estradiol (ESTRACE) 0.1 MG/GM Vaginal Cream     nitrofurantoin macrocrystal (MACRODANTIN) 100 MG Oral Cap      2. Urge incontinence  N39.41        3. Pelvic muscle wasting  N81.84       4. Post-operative state  Z98.890           Discussion Items:   Discussed mgmt of vulvovaginal atrophy with vaginal estrogen cream. Reviewed associated benefits, risks, alternatives, and goals. Recommend low dose 2x/week treatment.    Discussed management of recurrent urinary tract infections. Discussed use of pharmacologic treatment options for suppression therapy. Risks vs benefits reviewed with patient.     Treatment Plan:  Start Macrodantin 100 mg nightly for suppression  Start vag estrogen cream as prescribed  Continue solifenacin 5 mg  Bowel regimen  Bladder diet/drill  Pelvic floor exercises  Call with s/sx of UTI    All questions answered  She understands and agrees to plan    Return in about 4 weeks (around 4/15/2025) for post op visit with Dr. Baugh as planned, 3 months for frequent UTI (ph).    Alexsandra Younger PA-C    Note to patient: The 21st Century Cures Act makes medical notes like these available to patients in the interest of transparency.  However, be advised this is a medical document.  It is intended as peer to peer communication.  It is written in medical language and may contain abbreviations or verbiage that are unfamiliar.  It may appear blunt or direct.  Medical documents are intended to carry relevant information, facts as evident, and the clinical opinion of the practitioner.

## 2025-03-27 ENCOUNTER — TELEPHONE (OUTPATIENT)
Dept: UROLOGY | Facility: HOSPITAL | Age: 50
End: 2025-03-27

## 2025-04-07 ENCOUNTER — TELEPHONE (OUTPATIENT)
Dept: UROLOGY | Facility: HOSPITAL | Age: 50
End: 2025-04-07

## 2025-04-07 DIAGNOSIS — R30.0 DYSURIA: Primary | ICD-10-CM

## 2025-04-07 RX ORDER — NITROFURANTOIN 25; 75 MG/1; MG/1
100 CAPSULE ORAL 2 TIMES DAILY
Qty: 14 CAPSULE | Refills: 0 | Status: SHIPPED | OUTPATIENT
Start: 2025-04-07 | End: 2025-04-14

## 2025-04-07 NOTE — TELEPHONE ENCOUNTER
Telephone call received from patient.     Patient complains of UTI signs and symptoms including urgency, frequency, dysuria and back pain x 3 days  Denies fever    Allergies and previous cultures reviewed    Hx incomplete emptying:  No    Taking  for suppression, No.  Prescribed nightly NTF 3/18/25 but never started.    Using Estrace: No    Recent ucxs:  3/11/25 10-50k Ecoli  Fernandez sensitive  Tx:  NTF  bid x 7 days    1/7/25 >100k Ecoli   pan sens  Tx: NTF bid x 7 days    9/24/24 no growth  9/6/24 no growth    8/20/24 >100 ecoli, Keflex 500mg BID x 7d    Discussed with LUCIANA Vargas Macrobid 100 mg PO bid x 7 days.  Needs to schedule phone visit to discuss recurrent UTIs    Urine culture ordered, will test @ [facility]    Empiric antibiotics sent to patient's preferred pharmacy.   Encouraged PO hydration  All questions answered  Encouraged to call if symptoms worsen or fail to improve  Patient understands and agrees to plan

## 2025-04-14 ENCOUNTER — LAB ENCOUNTER (OUTPATIENT)
Dept: LAB | Age: 50
End: 2025-04-14
Attending: PHYSICIAN ASSISTANT
Payer: COMMERCIAL

## 2025-04-14 DIAGNOSIS — R30.0 DYSURIA: ICD-10-CM

## 2025-04-14 PROCEDURE — 87086 URINE CULTURE/COLONY COUNT: CPT

## 2025-04-16 ENCOUNTER — TELEPHONE (OUTPATIENT)
Dept: UROLOGY | Facility: HOSPITAL | Age: 50
End: 2025-04-16

## 2025-04-16 NOTE — TELEPHONE ENCOUNTER
Incoming telephone call from patient confirming receipt of voicemail with negative test results.   Patient reports back pain.   We suggested she schedule an appointment to be seen in the office with one of the Physician associates to discuss UTIs and back pain.   Transferred patient to  to schedule an appointment.   Patient to call office with any further questions or concerns.   Patient verbalized understanding and agrees to plan.

## 2025-04-16 NOTE — TELEPHONE ENCOUNTER
Telephone call to patient with test results.  No answer.  Detailed message left with information below.    Urine cx collected on 4/14/25 shows no growth.    Frequent UTI's, Yes    4/7/25 Cx ordered, pt never turned in                  specimen  Emp NTF bid x 7 days  **Needs to schedule TC with Alexsandra for NHAN    3/11/25 10-50k Ecoli  Fernandez sensitive  Tx:  NTF  bid x 7 days    1/7/25 >100k Ecoli   pan sens  Tx: NTF bid x 7 days    9/24/24 no growth  9/6/24 no growth    8/20/24 >100 ecoli, Keflex 500mg BID x 7d    Currently on suppression? No  Allergies reviewed    Discussed with LUCIANA Orantes schedule telephone visit to discuss recurrent UTI with PA.  Encouraged pt to call our office to schedule appt.

## 2025-04-17 DIAGNOSIS — F41.1 GAD (GENERALIZED ANXIETY DISORDER): ICD-10-CM

## 2025-04-21 RX ORDER — ALPRAZOLAM 0.25 MG
0.25 TABLET ORAL 2 TIMES DAILY PRN
Qty: 60 TABLET | Refills: 1 | OUTPATIENT
Start: 2025-04-21

## 2025-04-21 NOTE — TELEPHONE ENCOUNTER
Please review; protocol failed/ has no protocol      Fill dates for alprazolam     Recent fills: 11/22/024  Last Rx written: 11/22/2024  Last Office Visit: 08/27/2024    Recent Visits  Date Type Provider Dept   08/27/24 Office Visit Ligia Joe APRN [facility]

## 2025-04-29 ENCOUNTER — OFFICE VISIT (OUTPATIENT)
Dept: UROLOGY | Facility: HOSPITAL | Age: 50
End: 2025-04-29
Attending: PHYSICIAN ASSISTANT
Payer: COMMERCIAL

## 2025-04-29 VITALS — RESPIRATION RATE: 16 BRPM | HEIGHT: 64 IN | WEIGHT: 156 LBS | BODY MASS INDEX: 26.63 KG/M2

## 2025-04-29 DIAGNOSIS — N32.81 DETRUSOR INSTABILITY: ICD-10-CM

## 2025-04-29 DIAGNOSIS — N39.0 FREQUENT UTI: Primary | ICD-10-CM

## 2025-04-29 DIAGNOSIS — N39.41 URGE INCONTINENCE: ICD-10-CM

## 2025-04-29 DIAGNOSIS — N81.84 PELVIC MUSCLE WASTING: ICD-10-CM

## 2025-04-29 PROCEDURE — 99212 OFFICE O/P EST SF 10 MIN: CPT

## 2025-04-29 RX ORDER — NITROFURANTOIN MACROCRYSTALS 100 MG/1
100 CAPSULE ORAL NIGHTLY
Qty: 90 CAPSULE | Refills: 1 | Status: SHIPPED | OUTPATIENT
Start: 2025-04-29

## 2025-04-29 RX ORDER — ESTRADIOL 0.1 MG/G
CREAM VAGINAL
Qty: 42.5 G | Refills: 3 | Status: SHIPPED | OUTPATIENT
Start: 2025-04-29

## 2025-04-29 RX ORDER — SOLIFENACIN SUCCINATE 5 MG/1
5 TABLET, FILM COATED ORAL DAILY
Qty: 90 TABLET | Refills: 3 | Status: SHIPPED | OUTPATIENT
Start: 2025-04-29

## 2025-04-29 NOTE — PROGRESS NOTES
Patient presents to follow up frequent UTIs    S/p MUS, cysto on 11/25/24 with Dr. Baugh   PVR last visit wnl    Rx'd vag estrogen & Macrodantin last visit, pt didn't fill    Bowels reg  Denies current UTI s/sx    Stopped solifenacin 5 mg since last visit  Previously reported dry mouth but +improvement in urgency    Urine Hx:  4/14/25 No growth    4/7/25 pt didn't submit UCx ordered  Tx: empiric NTF bid x 7 days    3/11/25 10-50k Ecoli  Fernandez sensitive  Tx:  NTF  bid x 7 days    1/7/25 >100k Ecoli   pan sens  Tx: NTF bid x 7 days    9/24/24 no growth  9/6/24 no growth    8/20/24 >100 ecoli, Keflex 500mg BID x 7d    UDS 11/7/24:  20/5 mL & 284/5 mL  retirement 253 mL  DO @ 200 mL  Cough induced DO  No ANDREAS    Urogynecology Summary:  Urogynecology Summary  Prolapse: No  ANDREAS: No  Urge Incontinence: Yes  Nocturia Frequency: 2  Frequency: 1 - 2 hours  Incomplete emptying: No  Constipation: No  Wears pad day?: 1 (not qd)  Wears Pad Night?: 0  Activities are limited by UI/POP?: Yes  Currently Sexually Active: Yes    Vitals:  Resp 16   Ht 64\"   Wt 156 lb (70.8 kg)   LMP 09/08/2024 (Approximate)   BMI 26.78 kg/m²     GENERAL EXAM:  GENERAL: alert & oriented, NAD  HEENT: NC/AT, sclera without injection  SKIN: no rashes  LUNGS:  without increased respiratory effort  EXT: no edema    PELVIC EXAM:   Deferred     Impression/Plan:    ICD-10-CM    1. 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 Urge incontinence  N39.41 Solifenacin Succinate (VESICARE) 5 MG Oral Tab     Urine Culture, Routine      4. Pelvic muscle wasting  N81.84           Discussion Items:   Discussed mgmt of vulvovaginal atrophy & rUTIs with vaginal estrogen cream. Reviewed associated benefits, risks, alternatives, and goals. Recommend low dose 2x/week treatment.    Discussed management of recurrent urinary tract  infections. Discussed use of pharmacologic treatment options for suppression therapy. Risks vs benefits reviewed with patient.     Discussed dietary and behavioral modifications for mgmt of urinary symptoms.  Discussed weight management and benefits of weight loss on urinary symptoms.  Reviewed AUA/SUFU guidelines on mgmt of non-neurogenic OAB.  Discussed pharmacologic and nonpharmacologic mgmt options of urinary symptoms - reviewed risks, benefits, alternatives, and goals of treatment.  Discussed specific risks related to OAB meds including, but not limited to dry mouth, constipation, blurry vision, cognitive changes, and BP elevation.    Treatment Plan:  Start Macrodantin 100 mg nightly  Start low dose vag estrogen 2x weekly  Resume solifenacin 5 mg  Bowel regimen  Bladder diet/drill  Pelvic floor exercises  Call with s/sx of UTI -- provided with urine specimen cups & standing UCx order for [place] [facility]    All questions answered  She understands and agrees to plan    Return in about 3 months (around 7/29/2025) for UTI (phone).    Alexsandra Younger PA-C    Note to patient: The 21st Century Cures Act makes medical notes like these available to patients in the interest of transparency.  However, be advised this is a medical document.  It is intended as peer to peer communication.  It is written in medical language and may contain abbreviations or verbiage that are unfamiliar.  It may appear blunt or direct.  Medical documents are intended to carry relevant information, facts as evident, and the clinical opinion of the practitioner.

## 2025-06-30 ENCOUNTER — TELEPHONE (OUTPATIENT)
Dept: INTERNAL MEDICINE CLINIC | Facility: CLINIC | Age: 50
End: 2025-06-30

## 2025-06-30 NOTE — TELEPHONE ENCOUNTER
[employer] message sent to patient regarding care gaps. Pt is due for Physical and screening orders .  
Airway patent, Nasal mucosa clear. Mouth with normal mucosa. Throat has no vesicles, no oropharyngeal exudates and uvula is midline.

## 2025-08-12 ENCOUNTER — OFFICE VISIT (OUTPATIENT)
Dept: INTERNAL MEDICINE CLINIC | Facility: CLINIC | Age: 50
End: 2025-08-12

## 2025-08-12 VITALS
WEIGHT: 161.69 LBS | BODY MASS INDEX: 27.6 KG/M2 | HEIGHT: 64 IN | DIASTOLIC BLOOD PRESSURE: 76 MMHG | SYSTOLIC BLOOD PRESSURE: 132 MMHG | HEART RATE: 76 BPM

## 2025-08-12 DIAGNOSIS — L25.8 CONTACT DERMATITIS DUE TO OTHER AGENT, UNSPECIFIED CONTACT DERMATITIS TYPE: Primary | ICD-10-CM

## 2025-08-12 DIAGNOSIS — N39.0 FREQUENT UTI: ICD-10-CM

## 2025-08-12 PROCEDURE — 3008F BODY MASS INDEX DOCD: CPT | Performed by: INTERNAL MEDICINE

## 2025-08-12 PROCEDURE — 99214 OFFICE O/P EST MOD 30 MIN: CPT | Performed by: INTERNAL MEDICINE

## 2025-08-12 PROCEDURE — 3075F SYST BP GE 130 - 139MM HG: CPT | Performed by: INTERNAL MEDICINE

## 2025-08-12 PROCEDURE — 3078F DIAST BP <80 MM HG: CPT | Performed by: INTERNAL MEDICINE

## 2025-08-12 RX ORDER — PREDNISONE 10 MG/1
TABLET ORAL
Qty: 26 TABLET | Refills: 0 | Status: SHIPPED | OUTPATIENT
Start: 2025-08-12

## 2025-08-12 RX ORDER — AMLODIPINE BESYLATE 5 MG/1
10 TABLET ORAL DAILY
Qty: 180 TABLET | Refills: 0 | Status: SHIPPED | OUTPATIENT
Start: 2025-08-12

## (undated) DIAGNOSIS — Z12.31 SCREENING MAMMOGRAM, ENCOUNTER FOR: Primary | ICD-10-CM

## (undated) DEVICE — JELLY,LUBE,STERILE,FLIP TOP,TUBE,2-OZ: Brand: MEDLINE

## (undated) DEVICE — SOLUTION IRRIG 1000ML 0.9% NACL USP BTL

## (undated) DEVICE — MEDI-VAC NON-CONDUCTIVE SUCTION TUBING: Brand: CARDINAL HEALTH

## (undated) DEVICE — TISSUE RETRIEVAL SYSTEM: Brand: INZII RETRIEVAL SYSTEM

## (undated) DEVICE — TUR Y CYSTO TUBING SET IRRIG L96IN

## (undated) DEVICE — SOL  .9 1000ML BAG

## (undated) DEVICE — LAP CHOLE: Brand: MEDLINE INDUSTRIES, INC.

## (undated) DEVICE — [HIGH FLOW INSUFFLATOR,  DO NOT USE IF PACKAGE IS DAMAGED,  KEEP DRY,  KEEP AWAY FROM SUNLIGHT,  PROTECT FROM HEAT AND RADIOACTIVE SOURCES.]: Brand: PNEUMOSURE

## (undated) DEVICE — CAUTERY BLADE 2IN INS E1455

## (undated) DEVICE — SUT COAT VCRL 2-0 27IN ABSRB UD 26MM CT-2

## (undated) DEVICE — ABDOMINAL BINDER: Brand: DEROYAL

## (undated) DEVICE — DERMABOND LIQUID ADHESIVE

## (undated) DEVICE — STERILE H2O FOR IRRIG .

## (undated) DEVICE — SLEEVE COMPR MD KNEE LEN SGL USE KENDALL SCD

## (undated) DEVICE — SKIN PREP TRAY 4 COMPARTM TRAY: Brand: MEDLINE INDUSTRIES, INC.

## (undated) DEVICE — TROCARS: Brand: KII® BALLOON BLUNT TIP SYSTEM

## (undated) DEVICE — UROLOGY DRAIN BAG

## (undated) DEVICE — TROCAR: Brand: KII® SLEEVE

## (undated) DEVICE — MINOR GENERAL: Brand: MEDLINE INDUSTRIES, INC.

## (undated) DEVICE — DRAPE,UNDRBUT,WHT GRAD PCH,CAPPORT,20/CS: Brand: MEDLINE

## (undated) DEVICE — SOL  .9 1000ML BTL

## (undated) DEVICE — ADHESIVE SKIN TOP FOR WND CLSR DERMBND ADV

## (undated) DEVICE — TROCAR: Brand: KII FIOS FIRST ENTRY

## (undated) DEVICE — GLOVE SUR 6.5 SENSICARE PI PIP CRM PWD F

## (undated) DEVICE — SUTURE VICRYL 0 UR-6

## (undated) DEVICE — Device: Brand: JELCO

## (undated) DEVICE — DRAPE,LITHOTOMY,STERILE: Brand: MEDLINE

## (undated) DEVICE — UNDYED BRAIDED (POLYGLACTIN 910), SYNTHETIC ABSORBABLE SUTURE: Brand: COATED VICRYL

## (undated) DEVICE — LIGAMAX 5 MM ENDOSCOPIC MULTIPLE CLIP APPLIER: Brand: LIGAMAX

## (undated) DEVICE — GAMMEX® PI HYBRID SIZE 7.5, STERILE POWDER-FREE SURGICAL GLOVE, POLYISOPRENE AND NEOPRENE BLEND: Brand: GAMMEX

## (undated) DEVICE — SOLUTION IRRIG 1000ML ST H2O AQUALITE PLAS

## (undated) NOTE — LETTER
Date & Time: 8/22/2023, 1:51 PM  Patient: Luigi Staton  Encounter Provider(s):    Jimenez Xavier MD       To Whom It May Concern:    Luigi Staton was seen and treated in our department on 8/22/2023.  She should not return to work until 8/24/23    If you have any questions or concerns, please do not hesitate to call.        _____________________________  Physician/APC Signature

## (undated) NOTE — ED AVS SNAPSHOT
[facility] Emergency Department    [address]     [address] [place] [address]    Phone:  [phone]    Fax:  [phone]   MRN: [id number]    Department:  [facility] Emergency Department   Date of Visit:  5/30/ Admin Date Administration Dose                   05/30/2017  13:44 DilTIAZem HCl (CARDIZEM) injection 10 mg 20 mg                Admin Date Administration Dose                   05/30/2017  14:00 nitroGLYCERIN (NITROSTAT) SL tab 0.4 mg 0.4 mg to a primary care or a specialist physician for a follow-up visit, please tell this physician (or your personal doctor if your instructions are to return to your personal doctor) about any new or lasting problems.  The primary care or specialist physician m Manter  [address] [employer] ([address]) [address] [facility] [employer] ([address]) [address] ([address] [facility]) [phone]   Loy Concepcion [address] [employer]  [facility] &

## (undated) NOTE — LETTER
February 8, 2018     Berta Starks  [address]      Dear Carola Perez:    Below are the results from your recent visit:  Influenza is negative. Hope you are feeling better.        Resulted Orders   POCT RAPID STREP   Result Value Ref

## (undated) NOTE — MR AVS SNAPSHOT
[facility]  [address] [place] [address]  [phone]               Thank you for choosing us for your health care visit with Gregorio Byrnes.  Katlyn Carr MD.  We are glad to serve you and happy to provide you with this summary of your visit Medical Issues Discussed Today     Encounter for screening mammogram for breast cancer    -  Primary    Encounter for gynecological examination without abnormal finding          Instructions and Information about Your Health     None      Allergies as of M Moderation of alcohol consumption Men: limit to <= 2 drinks* per day. Women and lighter weight persons: limit to <= 1 drink* per day.                            Visit [facility] online at  [email or web address]

## (undated) NOTE — ED AVS SNAPSHOT
[facility] Emergency Department    [address]     [address] [place] [address]    Phone:  [phone]    Fax:  [address]   MRN: [id number]    Department:  [facility] Emergency Department   Date of Visit:  5/30/ and Class Registration line at [phone] or find a doctor online by visiting [email or web address].    IF THERE IS ANY CHANGE OR WORSENING OF YOUR CONDITION, CALL YOUR PRIMARY CARE PHYSICIAN AT ONCE OR RETURN IMMEDIATELY TO [address] [place] [address].     If

## (undated) NOTE — LETTER
[facility]YNECOLOG[facility]  [address]  [facility] [address]  PH: [phone]  FAX: [phone]       Date:  11/19/2024     Patient:  Steph Maza     To Whom It May Concern:    The above patient is having surgery under my care 11/25/24. Please excuse her from work 11/25/24-11/27/24.    The above patient is under my care regarding ongoing surgical recovery. It is medically necessary at this time to restrict her from physical activities.    Restrictions include no heavy lifting >10 lbs for the first 6 weeks of her recovery (11/25/24-1/7/25)    She may return to work on 1/8/25 with the following restrictions:    [x]  None    []  No heavy lifting (>15 lb) for    weeks    []  Part-time (no more than   hours per week for   weeks)    []  Other:      If there are any questions, I would be happy to answer them within the limits of medical confidentiality.    Sincerely,    Dr. Shanti Baugh

## (undated) NOTE — LETTER
AUTHORIZATION FOR SURGICAL OPERATION OR OTHER PROCEDURE    1. I hereby authorize Dr. Leon Vick, and [place] [employer] [facility] staff assigned to my case to perform the following operation and/or procedure at the [place] [employer] [facility]:    Removal and Reinsertion of Intrauterine Device    2. My physician has explained the nature and purpose of the operation or other procedure, possible alternative methods of treatment, the risks involved, and the possibility of complication to me. I acknowledge that no guarantee has been made as to the result that may be obtained. 3.  I recognize that, during the course of this operation, or other procedure, unforseen conditions may necessitate additional or different procedure than those listed above. I, therefore, further authorize and request that the above named physician, his/her physician assistants or designees perform such procedures as are, in his/her professional opinion, necessary and desirable. 4.  Any tissue or organs removed in the operation or other procedure may be disposed of by and at the discretion of the [facility] and [facility]. 5.  I understand that in the event of a medical emergency, I will be transported by local paramedics to [facility] or other [facility] emergency department. 6.  I certify that I have read and fully understand the above consent to operation and/or other procedure. 7.  I acknowledge that my physician has explained sedation/analgesia administration to me including the risks and benefits. I consent to the administration of sedation/analgesia as may be necessary or desirable in the judgement of my physician. Witness signature: ___________________________________________________ Date:  ______/______/_____                    Time:  ________ A. M.  P.M.        Patient Name:  ______________________________________________________  (please print)      Patient signature: ___________________________________________________             Relationship to Patient:           []  Parent    Responsible person                          []  Spouse  In case of minor or                    [] Other  _____________   Incompetent name:  __________________________________________________                               (please print)      _____________      Responsible person  In case of minor or  Incompetent signature:  _______________________________________________    Statement of Physician  My signature below affirms that prior to the time of the procedure, I have explained to the patient and/or his/her guardian, the risks and benefits involved in the proposed treatment and any reasonable alternative to the proposed treatment. I have also explained the risks and benefits involved in the refusal of the proposed treatment and have answered the patient's questions.                         Date:  ______/______/_______  Provider                      Signature:  __________________________________________________________       Time:  ___________ A.M    P.M.

## (undated) NOTE — MR AVS SNAPSHOT
[facility]. [address] [place] [address]  [address]  [address]  [phone]               Thank you for choosing us for your health care visit with Lolis Sexton MD.  We are glad to serve you and happy to provide you with this summary of yo EAT THESE FOODS MORE OFTEN: EAT THESE FOODS LESS OFTEN:   Make half your plate fruits and vegetables Highly refined, white starches including white bread, rice and pasta   Eat plenty of protein, keep the fat content low Sugars:  sodas and sports drinks, ca

## (undated) NOTE — LETTER
10/28/2019          To Whom It May Concern:    Berta Starks is currently under my medical care. Please excuse [facility] for today's date 10/28/19. She may return to work on 10/29/19. If you require additional information please contact our office.

## (undated) NOTE — LETTER
1/25/2019          To Whom It May Concern:    Mike Greene is currently under my medical care and may not return to work at this time. Please excuse [place] for 1 days. She may return to work on 01/28/19   Activity is restricted as follows: none.

## (undated) NOTE — LETTER
May 30, 2017    Patient: Cynthia Tidwell   Date of Visit: 5/30/2017       To Whom It May Concern:    Cynthia Tidwell was seen and treated in our emergency department on 5/30/2017. She should not return to work until Thursday 6/1.     If you have any quest

## (undated) NOTE — LETTER
3/21/2019              [address] [facility]         Dear Loy Rowan,    It was a pleasure to see you. Your pap was normal.  There is no need for further testing at this time.   I look forward to seeing you at your n